# Patient Record
Sex: FEMALE | Race: AMERICAN INDIAN OR ALASKA NATIVE
[De-identification: names, ages, dates, MRNs, and addresses within clinical notes are randomized per-mention and may not be internally consistent; named-entity substitution may affect disease eponyms.]

---

## 2019-12-31 ENCOUNTER — HOSPITAL ENCOUNTER (INPATIENT)
Dept: HOSPITAL 5 - ED | Age: 79
LOS: 3 days | Discharge: NURSING, REHAB OR LONG-TERM CARE | DRG: 70 | End: 2020-01-03
Attending: INTERNAL MEDICINE | Admitting: INTERNAL MEDICINE
Payer: MEDICARE

## 2019-12-31 DIAGNOSIS — R47.81: ICD-10-CM

## 2019-12-31 DIAGNOSIS — R79.89: ICD-10-CM

## 2019-12-31 DIAGNOSIS — N18.6: ICD-10-CM

## 2019-12-31 DIAGNOSIS — R94.31: ICD-10-CM

## 2019-12-31 DIAGNOSIS — I12.0: ICD-10-CM

## 2019-12-31 DIAGNOSIS — R47.1: ICD-10-CM

## 2019-12-31 DIAGNOSIS — Z79.899: ICD-10-CM

## 2019-12-31 DIAGNOSIS — E87.5: ICD-10-CM

## 2019-12-31 DIAGNOSIS — Z60.2: ICD-10-CM

## 2019-12-31 DIAGNOSIS — E11.649: ICD-10-CM

## 2019-12-31 DIAGNOSIS — K21.9: ICD-10-CM

## 2019-12-31 DIAGNOSIS — E78.5: ICD-10-CM

## 2019-12-31 DIAGNOSIS — G93.41: Primary | ICD-10-CM

## 2019-12-31 DIAGNOSIS — M17.11: ICD-10-CM

## 2019-12-31 DIAGNOSIS — Z79.4: ICD-10-CM

## 2019-12-31 DIAGNOSIS — E03.9: ICD-10-CM

## 2019-12-31 DIAGNOSIS — I42.9: ICD-10-CM

## 2019-12-31 DIAGNOSIS — R13.10: ICD-10-CM

## 2019-12-31 DIAGNOSIS — E11.22: ICD-10-CM

## 2019-12-31 DIAGNOSIS — Z99.2: ICD-10-CM

## 2019-12-31 DIAGNOSIS — G89.29: ICD-10-CM

## 2019-12-31 DIAGNOSIS — G45.9: ICD-10-CM

## 2019-12-31 DIAGNOSIS — M16.11: ICD-10-CM

## 2019-12-31 LAB
ALBUMIN SERPL-MCNC: 3.2 G/DL (ref 3.9–5)
ALT SERPL-CCNC: 11 UNITS/L (ref 7–56)
BAND NEUTROPHILS # (MANUAL): 0 K/MM3
BILIRUB UR QL STRIP: (no result)
BLOOD UR QL VISUAL: (no result)
BUN SERPL-MCNC: 16 MG/DL (ref 7–17)
BUN/CREAT SERPL: 4 %
CALCIUM SERPL-MCNC: 8.7 MG/DL (ref 8.4–10.2)
HCT VFR BLD CALC: 46.4 % (ref 30.3–42.9)
HDLC SERPL-MCNC: 62 MG/DL (ref 40–59)
HEMOLYSIS INDEX: 26
HGB BLD-MCNC: 14.3 GM/DL (ref 10.1–14.3)
HYALINE CASTS #/AREA URNS LPF: 1 /LPF
MCHC RBC AUTO-ENTMCNC: 31 % (ref 30–34)
MCV RBC AUTO: 83 FL (ref 79–97)
MYELOCYTES # (MANUAL): 0 K/MM3
PH UR STRIP: 9 [PH] (ref 5–7)
PLATELET # BLD: 409 K/MM3 (ref 140–440)
PROMYELOCYTES # (MANUAL): 0 K/MM3
PROT UR STRIP-MCNC: (no result) MG/DL
RBC # BLD AUTO: 5.57 M/MM3 (ref 3.65–5.03)
RBC #/AREA URNS HPF: 1 /HPF (ref 0–6)
TOTAL CELLS COUNTED BLD: 100
UROBILINOGEN UR-MCNC: < 2 MG/DL (ref ?–2)
WBC #/AREA URNS HPF: 1 /HPF (ref 0–6)

## 2019-12-31 PROCEDURE — 70450 CT HEAD/BRAIN W/O DYE: CPT

## 2019-12-31 PROCEDURE — 84439 ASSAY OF FREE THYROXINE: CPT

## 2019-12-31 PROCEDURE — 93306 TTE W/DOPPLER COMPLETE: CPT

## 2019-12-31 PROCEDURE — 81001 URINALYSIS AUTO W/SCOPE: CPT

## 2019-12-31 PROCEDURE — 93010 ELECTROCARDIOGRAM REPORT: CPT

## 2019-12-31 PROCEDURE — 84436 ASSAY OF TOTAL THYROXINE: CPT

## 2019-12-31 PROCEDURE — C9113 INJ PANTOPRAZOLE SODIUM, VIA: HCPCS

## 2019-12-31 PROCEDURE — 84484 ASSAY OF TROPONIN QUANT: CPT

## 2019-12-31 PROCEDURE — 85025 COMPLETE CBC W/AUTO DIFF WBC: CPT

## 2019-12-31 PROCEDURE — 80074 ACUTE HEPATITIS PANEL: CPT

## 2019-12-31 PROCEDURE — 80061 LIPID PANEL: CPT

## 2019-12-31 PROCEDURE — 84443 ASSAY THYROID STIM HORMONE: CPT

## 2019-12-31 PROCEDURE — 84100 ASSAY OF PHOSPHORUS: CPT

## 2019-12-31 PROCEDURE — 71045 X-RAY EXAM CHEST 1 VIEW: CPT

## 2019-12-31 PROCEDURE — 83690 ASSAY OF LIPASE: CPT

## 2019-12-31 PROCEDURE — 78452 HT MUSCLE IMAGE SPECT MULT: CPT

## 2019-12-31 PROCEDURE — 80053 COMPREHEN METABOLIC PANEL: CPT

## 2019-12-31 PROCEDURE — 80048 BASIC METABOLIC PNL TOTAL CA: CPT

## 2019-12-31 PROCEDURE — 70547 MR ANGIOGRAPHY NECK W/O DYE: CPT

## 2019-12-31 PROCEDURE — 82962 GLUCOSE BLOOD TEST: CPT

## 2019-12-31 PROCEDURE — 85027 COMPLETE CBC AUTOMATED: CPT

## 2019-12-31 PROCEDURE — 93017 CV STRESS TEST TRACING ONLY: CPT

## 2019-12-31 PROCEDURE — 85007 BL SMEAR W/DIFF WBC COUNT: CPT

## 2019-12-31 PROCEDURE — A9502 TC99M TETROFOSMIN: HCPCS

## 2019-12-31 PROCEDURE — 70551 MRI BRAIN STEM W/O DYE: CPT

## 2019-12-31 PROCEDURE — 36415 COLL VENOUS BLD VENIPUNCTURE: CPT

## 2019-12-31 PROCEDURE — 93005 ELECTROCARDIOGRAM TRACING: CPT

## 2019-12-31 PROCEDURE — 70544 MR ANGIOGRAPHY HEAD W/O DYE: CPT

## 2019-12-31 RX ADMIN — DEXTROSE AND SODIUM CHLORIDE SCH MLS/HR: 5; .45 INJECTION, SOLUTION INTRAVENOUS at 20:37

## 2019-12-31 SDOH — SOCIAL STABILITY - SOCIAL INSECURITY: PROBLEMS RELATED TO LIVING ALONE: Z60.2

## 2019-12-31 NOTE — XRAY REPORT
RIGHT HIP 4 VIEWS



INDICATION / CLINICAL INFORMATION:

Right hip pain. History of arthritis.



COMPARISON:

None available.

 

FINDINGS:



BONES and JOINT(S): No acute fracture or subluxation. Severe osteoarthritis of the right hip is noted
. There is mild osteoarthritis of the left hip.

SOFT TISSUES: Severe generalized atherosclerosis is present. No additional significant abnormality.



ADDITIONAL FINDINGS: None.



IMPRESSION:

1. No acute findings.

2. Severe osteoarthritis of the right hip.



Signer Name: Brando Smith MD 

Signed: 12/31/2019 12:54 PM

 Workstation Name: [id number]

## 2019-12-31 NOTE — CAT SCAN REPORT
CT HEAD WITHOUT CONTRAST



HISTORY: Altered mental status.



TECHNIQUE:  Axial imaging performed from the skull apex through the skull base without the use of con
trast.  All CT scans at this location are performed using CT dose reduction for ALARA by means of aut
omated exposure control. 



COMPARISON:  None



FINDINGS:  



Parenchyma:  No acute intracranial hemorrhage or parenchymal abnormality.. Mild cortical atrophy and 
mild hypoattenuation throughout the white matter is noted and consistent with chronic microvascular i
schemic disease.

Ventricles:  There is mild diffuse brain atrophy with commensurate ventricular enlargement which is l
ikely age appropriate.  

Soft tissues:  Soft tissues including the orbits appear normal.   

Bones:  No acute osseous abnormality.   

Sinuses:  Sinuses and mastoid air cells are clear.





IMPRESSION: No acute abnormality. Volume loss and nonspecific chronic white matter changes.



Signer Name: Ollie Stark Jr, MD 

Signed: 12/31/2019 2:17 PM

 Workstation Name: [id number]

## 2019-12-31 NOTE — XRAY REPORT
RIGHT KNEE 3 VIEWS



INDICATION / CLINICAL INFORMATION:

Right knee pain.



COMPARISON:

None available.

 

FINDINGS:



BONES and JOINT(S): No acute fracture or subluxation. Mild to moderate tricompartmental osteoarthriti
s is noted.

SOFT TISSUES: No acute abnormality. There is severe generalized atherosclerosis.



ADDITIONAL FINDINGS: None.



IMPRESSION:

1. No acute findings.

2. Mild to moderate osteoarthritis.



Signer Name: Brando Smith MD 

Signed: 12/31/2019 1:13 PM

 Workstation Name: [id number]

## 2019-12-31 NOTE — XRAY REPORT
CHEST 1 VIEW 12/31/2019 12:07 PM



INDICATION / CLINICAL INFORMATION:

ams, due for dialysis.



COMPARISON: 

None available.



FINDINGS:



SUPPORT DEVICES: A right internal jugular vein PermCath terminates over the right atrium.

HEART / MEDIASTINUM: The heart is normal in size. The aorta is ectatic and mildly calcified. 

LUNGS / PLEURA: No significant pulmonary or pleural abnormality. No pneumothorax. 



ADDITIONAL FINDINGS: There are moderate to severe degenerative changes of the shoulders as well as de
generative changes throughout the spine.



IMPRESSION:

1. No acute abnormality of the chest. 

2. Additional findings as above.



Signer Name: Brando Smith MD 

Signed: 12/31/2019 12:53 PM

 Workstation Name: [id number]

## 2019-12-31 NOTE — CONSULTATION
History of Present Illness





- Reason for Consult


Consult date: 12/31/19


end stage renal disease, hyperkalemia


Requesting physician: ROSA BEY





- History of Present Illness





This is a 78 y/o F with PMH of ESRD on HD, HTN, DM type 2 on insulin, 

hypothyroidism, osteoarthritis, chronic hip pain who presented to [facility] ED with 

altered mental status. Pt's son visited pt and noticed she had slurred speech 

and confusion and had similar episode in the past which was related to 

hypoglycemia per medical record. In ED, BG was in the 70s, pt was vomited after 

son tried to feed her yogurt and juice, was given IV D50 with no specific 

improvement. CT Head showed no acute finding. Right knee X Ray showed no acute 

finding, but mild to moderate osteoarthritis. Right Hip X Ray showed severe 

osteoarthritis, no acute finding. Pt seen in her room, awake, recognized who I 

was, oriented to person, place, and time, follows simple commands, no family at 

bedside. Pt denies shortness of breath, nausea, vomiting, chest pain, abdominal 

pain, black or bloody stool. Pt states her only complaint right now is pain in 

her hip and knee. We were consulted to evaluate this pt who has ESRD. This pt 

undergoes outpatient HD at [facility] every TTS, last HD treatment was

12/28/19.














Past History


Past Medical History: diabetes, dialysis, ESRD, hypertension, hypothyroidism





Medications and Allergies


                                    Allergies











Allergy/AdvReac Type Severity Reaction Status Date / Time


 


No Known Allergies Allergy   Unverified 12/31/19 11:23











                                Home Medications











 Medication  Instructions  Recorded  Confirmed  Last Taken  Type


 


ALBUTEROL Inhaler (OR & NICU) 2 puff IH QID PRN 12/31/19 12/31/19 Unknown 

History





[ProAir HFA Inhaler]     


 


AtorvaSTATin [Lipitor] 40 mg PO QHS 12/31/19 12/31/19 Unknown History


 


Hydralazine HCl 50 mg PO TID 12/31/19 12/31/19 Unknown History


 


Insulin Glargine [Lantus VIAL] 20 unit SUB-Q QHS 12/31/19 12/31/19 Unknown 

History


 


Levothyroxine [Synthroid] 50 mcg PO QAM 12/31/19 12/31/19 Unknown History


 


Losartan [Cozaar] 50 mg PO QDAY 12/31/19 12/31/19 Unknown History


 


Metoprolol [Lopressor] 25 mg PO BID 12/31/19 12/31/19 Unknown History


 


NIFEdipine [Nifedipine ER] 90 mg PO DAILY 12/31/19 12/31/19 Unknown History


 


Pantoprazole [Protonix] 40 mg PO QAM 12/31/19 12/31/19 Unknown History


 


Potassium Chloride [K-Dur] 20 meq PO BID 12/31/19 12/31/19 Unknown History


 


amLODIPine [Norvasc] 10 mg PO DAILY 12/31/19 12/31/19 Unknown History


 


carvediloL [Coreg] 6.25 mg PO BID 12/31/19 12/31/19 Unknown History


 


lisinopriL [Zestril] 20 mg PO QDAY 12/31/19 12/31/19 Unknown History











Active Meds: 


Active Medications





Acetaminophen (Tylenol)  650 mg PO Q4H PRN


   PRN Reason: Pain, Mild (1-3)


Albuterol (Proventil)  2.5 mg IH Q4HRT PRN


   PRN Reason: Shortness Of Breath


Aspirin (Aspirin)  300 mg CO QDAY LUCAS


Bisacodyl (Dulcolax)  10 mg CO QDAY PRN


   PRN Reason: Constipation


Dextrose (D50w (25gm) Syringe)  50 ml IV Q30MIN PRN; Protocol


   PRN Reason: Hypoglycemia


Dextrose/Sodium Chloride (D5/0.45ns)  1,000 mls @ 42 mls/hr IV AS DIRECT LUCAS


Sodium Chloride (Nacl 0.9%)  100 mls @ 999 mls/hr IV ARGELIA PRN


   PRN Reason: Hypotension


Insulin Human Regular (Humulin R)  0 units SUB-Q Q6HR LUCAS; Protocol


Magnesium Hydroxide (Milk Of Magnesia)  30 ml PO Q4H PRN


   PRN Reason: Constipation


Metoclopramide HCl (Reglan)  10 mg PO Q6H PRN


   PRN Reason: Nausea And Vomiting


Ondansetron HCl (Zofran)  4 mg IV Q8H PRN


   PRN Reason: Nausea And Vomiting


Pantoprazole Sodium (Protonix)  40 mg IV QDAY LUCAS


Promethazine HCl (Phenergan)  25 mg CO Q6H PRN


   PRN Reason: Nausea And Vomiting


Sodium Chloride (Sodium Chloride Flush Syringe 10 Ml)  10 ml IV PRN PRN


   PRN Reason: LINE FLUSH











Review of Systems


Constitutional: fatigue, weakness, no fever, no chills


Cardiovascular: no chest pain, no shortness of breath, no dyspnea on exertion


Respiratory: no shortness of breath, no dyspnea on exertion


Gastrointestinal: no abdominal pain, no nausea, no vomiting, no diarrhea, no 

constipation, no hematemesis, no melena


Musculoskeletal: arthritis, other (knee and hip pain)


Integumentary: no wounds


Neurological: other (confused on admission)





Exam





- Vital Signs


Vital signs: 


                                   Vital Signs











Temp Pulse Resp BP Pulse Ox


 


 97.2 F L  83   18   168/87   97 


 


 12/31/19 11:12  12/31/19 11:12  12/31/19 11:12  12/31/19 11:12  12/31/19 11:12














- General Appearance


General appearance: well-developed


EENT: ATNC


Neck: Present: neck supple


Respiratory: Decreased Breath Sounds


Heart: regular, S1S2, other (ACCESS: Right IJ Perm Catheter intact)


Gastrointestinal: Present: normoactive bowel sounds.  Absent: tenderness


Integumentary: warm and dry


Neurologic: alert and oriented x3


Musculoskeletal: Present: other (no edema to BLE)


Psychiatric: mood/affect appropriate, cooperative





Results





- Lab Results





                                 12/31/19 11:40





                                 12/31/19 11:40


                             Most recent lab results











Calcium  8.7 mg/dL (8.4-10.2)   12/31/19  11:40    














Assessment and Plan





Acute Metabolic Encephalopathy, ? CVA


ESRD on HD


HTN


DM Type 2 on insulin


Hypothyroidism


Hyperkalemia


Osteoarthritis of right hip and right knee


Elevated Troponin








Plan:





- No acute indication for HD today, will also hold off on HD today given ? CVA


- HD tomorrow for gentle UF and clearance, goal UF 0-1 liter, will try to avoid 

hypotension during HD


- Assess need for HD on daily basis


- CT Head showed no acute finding, MRI Brain, MRA Head, carotid dopplers pending


- Permissive HTN, s/p ASA, neurology consulted


- Holding BP medications for now


- Cardiology consulted for possible lateral ischemia


- Renally dose meds


- This pt undergoes outpatient HD at [facility] every TTS


- Renal plan d/w Dr Adkins

## 2019-12-31 NOTE — HISTORY AND PHYSICAL REPORT
History of Present Illness


Date of admission: 


12/31/19 15:23





Chief complaint: 





Altered mental status


History of present illness: 





79-year-old woman who presents to the hospital with altered mental status.  She 

lives alone but her son visits her at night and administers Lantus if her sugar 

is greater than 250.  She is responsible only for her oral medications.  He 

states that he saw her 2 nights ago on December 29 and gave her insulin.  Her 

son usually receives a call every night, but he did not receive a call on the 

night of December 30, he assumed it was because her glucose was less than 250, 

then he went to check on her this morning and found that she had slurred speech 

and confusion, she had similar episode 2 to 3 months ago which was related to 

hypoglycemia.  However in the ER the glucose was in the 70s, the son attempted 

to feed her juice and yogurt but she vomited them shortly after swallowing.  The

patient has chronic left hip and knee pain which are unchanged


-Per the ED staff, she had waxing and waning mental status, she was agitated at 

times wanting to leave complain that she had drank too much at other times she 

was confused and withdrawn.  She failed swallow screen in the ED and therefore 

was put n.p.o., she received IV dextrose with no specific improvement





Past medical history; hypertension, diabetes, end-stage renal disease on 

dialysis Tuesday Thursday Saturday, osteoarthritis, GERD, hypothyroidism, 

hyperlipidemia,





Past surgical history; unknown





Social history, no history of smoking, lives at home, has son visits her nightly





Family history noncontributory











Medications and Allergies


                                    Allergies











Allergy/AdvReac Type Severity Reaction Status Date / Time


 


No Known Allergies Allergy   Unverified 12/31/19 11:23











                                Home Medications











 Medication  Instructions  Recorded  Confirmed  Last Taken  Type


 


ALBUTEROL Inhaler (OR & NICU) 2 puff IH QID PRN 12/31/19 12/31/19 Unknown Histor

y





[ProAir HFA Inhaler]     


 


AtorvaSTATin [Lipitor] 40 mg PO QHS 12/31/19 12/31/19 Unknown History


 


Hydralazine HCl 50 mg PO TID 12/31/19 12/31/19 Unknown History


 


Insulin Glargine [Lantus VIAL] 20 unit SUB-Q QHS 12/31/19 12/31/19 Unknown 

History


 


Levothyroxine [Synthroid] 50 mcg PO QAM 12/31/19 12/31/19 Unknown History


 


Losartan [Cozaar] 50 mg PO QDAY 12/31/19 12/31/19 Unknown History


 


Metoprolol [Lopressor] 25 mg PO BID 12/31/19 12/31/19 Unknown History


 


NIFEdipine [Nifedipine ER] 90 mg PO DAILY 12/31/19 12/31/19 Unknown History


 


Pantoprazole [Protonix] 40 mg PO QAM 12/31/19 12/31/19 Unknown History


 


Potassium Chloride [K-Dur] 20 meq PO BID 12/31/19 12/31/19 Unknown History


 


amLODIPine [Norvasc] 10 mg PO DAILY 12/31/19 12/31/19 Unknown History


 


carvediloL [Coreg] 6.25 mg PO BID 12/31/19 12/31/19 Unknown History


 


lisinopriL [Zestril] 20 mg PO QDAY 12/31/19 12/31/19 Unknown History











Active Meds: 


Active Medications





Acetaminophen (Tylenol)  650 mg PO Q4H PRN


   PRN Reason: Pain, Mild (1-3)


Albuterol (Proventil)  2.5 mg IH Q4HRT PRN


   PRN Reason: Shortness Of Breath


Aspirin (Aspirin)  300 mg VA QDAY LUCAS


Bisacodyl (Dulcolax)  10 mg VA QDAY PRN


   PRN Reason: Constipation


Dextrose (D50w (25gm) Syringe)  50 ml IV Q30MIN PRN; Protocol


   PRN Reason: Hypoglycemia


Dextrose/Sodium Chloride (D5/0.45ns)  1,000 mls @ 42 mls/hr IV AS DIRECT LUCAS


Insulin Human Regular (Humulin R)  0 units SUB-Q Q6HR [facility]; Protocol


Magnesium Hydroxide (Milk Of Magnesia)  30 ml PO Q4H PRN


   PRN Reason: Constipation


Metoclopramide HCl (Reglan)  10 mg PO Q6H PRN


   PRN Reason: Nausea And Vomiting


Ondansetron HCl (Zofran)  4 mg IV Q8H PRN


   PRN Reason: Nausea And Vomiting


Pantoprazole Sodium (Protonix)  40 mg IV QDAY LUCAS


Promethazine HCl (Phenergan)  25 mg VA Q6H PRN


   PRN Reason: Nausea And Vomiting


Sodium Chloride (Sodium Chloride Flush Syringe 10 Ml)  10 ml INJ PRN PRN


   PRN Reason: LINE FLUSH











Review of Systems


All systems: negative


Constitutional: poor appetite


Ears, nose, mouth and throat: no ear pain


Cardiovascular: no chest pain


Respiratory: no cough


Gastrointestinal: vomiting, no abdominal pain


Rectal: no pain


Musculoskeletal: no neck stiffness


Integumentary: no rash


Neurological: no head injury


Psychiatric: no anxiety


Endocrine: no cold intolerance


Hematologic/Lymphatic: no easy bruising


Allergic/Immunologic: no urticaria





Exam





- Constitutional


Vitals: 


                                        











Temp Pulse Resp BP Pulse Ox


 


 97.4 F L  70   13   157/79   96 


 


 12/31/19 15:56  12/31/19 15:00  12/31/19 15:00  12/31/19 15:00  12/31/19 15:00











General appearance: Present: mild distress, well-nourished





- EENT


Eyes: Present: PERRL


ENT: hearing intact, clear oral mucosa





- Neck


Neck: Present: supple, normal ROM





- Respiratory


Respiratory effort: normal


Respiratory: bilateral: CTA





- Cardiovascular


Heart Sounds: Present: S1 & S2.  Absent: rub, click





- Extremities


Extremities: pulses symmetrical, No edema


Peripheral Pulses: within normal limits





- Abdominal


General gastrointestinal: Present: soft, non-tender, non-distended, normal bowel

 sounds


Female genitourinary: Present: normal





- Integumentary


Integumentary: Present: clear, warm, dry





- Musculoskeletal


Musculoskeletal: gait normal, strength equal bilaterally





- Psychiatric


Psychiatric: appropriate mood/affect, intact judgment & insight





- Neurologic


Neurologic: CNII-XII intact, moves all extremities





Results





- Labs


CBC & Chem 7: 


                                 01/01/20 05:27





                                 01/01/20 05:27


Labs: 


                             Laboratory Last Values











WBC  7.7 K/mm3 (4.5-11.0)   12/31/19  11:40    


 


RBC  5.57 M/mm3 (3.65-5.03)  H  12/31/19  11:40    


 


Hgb  14.3 gm/dl (10.1-14.3)   12/31/19  11:40    


 


Hct  46.4 % (30.3-42.9)  H  12/31/19  11:40    


 


MCV  83 fl (79-97)   12/31/19  11:40    


 


MCH  26 pg (28-32)  L  12/31/19  11:40    


 


MCHC  31 % (30-34)   12/31/19  11:40    


 


RDW  16.6 % (13.2-15.2)  H  12/31/19  11:40    


 


Plt Count  409 K/mm3 (140-440)   12/31/19  11:40    


 


Add Manual Diff  Complete   12/31/19  11:40    


 


Total Counted  100   12/31/19  11:40    


 


Seg Neuts % (Manual)  91.0 % (40.0-70.0)  H  12/31/19  11:40    


 


Band Neutrophils %  0 %  12/31/19  11:40    


 


Lymphocytes % (Manual)  7.0 % (13.4-35.0)  L  12/31/19  11:40    


 


Reactive Lymphs % (Man)  0 %  12/31/19  11:40    


 


Monocytes % (Manual)  1.0 % (0.0-7.3)   12/31/19  11:40    


 


Eosinophils % (Manual)  0 % (0.0-4.3)   12/31/19  11:40    


 


Basophils % (Manual)  1.0 % (0.0-1.8)   12/31/19  11:40    


 


Metamyelocytes %  0 %  12/31/19  11:40    


 


Myelocytes %  0 %  12/31/19  11:40    


 


Promyelocytes %  0 %  12/31/19  11:40    


 


Blast Cells %  0 %  12/31/19  11:40    


 


Nucleated RBC %  1.0 % (0.0-0.9)  H  12/31/19  11:40    


 


Seg Neutrophils # Man  7.0 K/mm3 (1.8-7.7)   12/31/19  11:40    


 


Band Neutrophils #  0.0 K/mm3  12/31/19  11:40    


 


Lymphocytes # (Manual)  0.5 K/mm3 (1.2-5.4)  L  12/31/19  11:40    


 


Abs React Lymphs (Man)  0.0 K/mm3  12/31/19  11:40    


 


Monocytes # (Manual)  0.1 K/mm3 (0.0-0.8)   12/31/19  11:40    


 


Eosinophils # (Manual)  0.0 K/mm3 (0.0-0.4)   12/31/19  11:40    


 


Basophils # (Manual)  0.1 K/mm3 (0.0-0.1)   12/31/19  11:40    


 


Metamyelocytes #  0.0 K/mm3  12/31/19  11:40    


 


Myelocytes #  0.0 K/mm3  12/31/19  11:40    


 


Promyelocytes #  0.0 K/mm3  12/31/19  11:40    


 


Blast Cells #  0.0 K/mm3  12/31/19  11:40    


 


WBC Morphology  Not Reportable   12/31/19  11:40    


 


Hypersegmented Neuts  Not Reportable   12/31/19  11:40    


 


Hyposegmented Neuts  Not Reportable   12/31/19  11:40    


 


Hypogranular Neuts  Not Reportable   12/31/19  11:40    


 


Smudge Cells  Not Reportable   12/31/19  11:40    


 


Toxic Granulation  Not Reportable   12/31/19  11:40    


 


Toxic Vacuolation  Not Reportable   12/31/19  11:40    


 


Dohle Bodies  Not Reportable   12/31/19  11:40    


 


Pelger-Huet Anomaly  Not Reportable   12/31/19  11:40    


 


Clara Rods  Not Reportable   12/31/19  11:40    


 


Platelet Estimate  Consistent w auto   12/31/19  11:40    


 


Clumped Platelets  Not Reportable   12/31/19  11:40    


 


Plt Clumps, EDTA  Not Reportable   12/31/19  11:40    


 


Large Platelets  Not Reportable   12/31/19  11:40    


 


Giant Platelets  Not Reportable   12/31/19  11:40    


 


Platelet Satelliting  Not Reportable   12/31/19  11:40    


 


Plt Morphology Comment  Not Reportable   12/31/19  11:40    


 


RBC Morphology  Not Reportable   12/31/19  11:40    


 


Dimorphic RBCs  Not Reportable   12/31/19  11:40    


 


Polychromasia  Not Reportable   12/31/19  11:40    


 


Hypochromasia  Few   12/31/19  11:40    


 


Poikilocytosis  Few   12/31/19  11:40    


 


Anisocytosis  Few   12/31/19  11:40    


 


Microcytosis  Not Reportable   12/31/19  11:40    


 


Macrocytosis  Not Reportable   12/31/19  11:40    


 


Spherocytes  Not Reportable   12/31/19  11:40    


 


Pappenheimer Bodies  Not Reportable   12/31/19  11:40    


 


Sickle Cells  Not Reportable   12/31/19  11:40    


 


Target Cells  Few   12/31/19  11:40    


 


Tear Drop Cells  Not Reportable   12/31/19  11:40    


 


Ovalocytes  Not Reportable   12/31/19  11:40    


 


Helmet Cells  Not Reportable   12/31/19  11:40    


 


[employer] Bodies  Not Reportable   12/31/19  11:40    


 


Cabot Rings  Not Reportable   12/31/19  11:40    


 


[place] Cells  Not Reportable   12/31/19  11:40    


 


Bite Cells  Not Reportable   12/31/19  11:40    


 


Crenated Cell  Not Reportable   12/31/19  11:40    


 


Elliptocytes  Not Reportable   12/31/19  11:40    


 


Acanthocytes (Spur)  Not Reportable   12/31/19  11:40    


 


Rouleaux  Not Reportable   12/31/19  11:40    


 


Hemoglobin C Crystals  Not Reportable   12/31/19  11:40    


 


Schistocytes  Not Reportable   12/31/19  11:40    


 


Malaria parasites  Not Reportable   12/31/19  11:40    


 


Miah Bodies  Not Reportable   12/31/19  11:40    


 


Hem Pathologist Commnt  No   12/31/19  11:40    


 


Sodium  139 mmol/L (137-145)   12/31/19  11:40    


 


Potassium  5.1 mmol/L (3.6-5.0)  H  12/31/19  11:40    


 


Chloride  99.7 mmol/L ()   12/31/19  11:40    


 


Carbon Dioxide  24 mmol/L (22-30)   12/31/19  11:40    


 


Anion Gap  20 mmol/L  12/31/19  11:40    


 


BUN  16 mg/dL (7-17)   12/31/19  11:40    


 


Creatinine  3.9 mg/dL (0.7-1.2)  H  12/31/19  11:40    


 


Estimated GFR  13 ml/min  12/31/19  11:40    


 


BUN/Creatinine Ratio  4 %  12/31/19  11:40    


 


Glucose  70 mg/dL ()   12/31/19  11:40    


 


POC Glucose  71  ()   12/31/19  16:05    


 


Calcium  8.7 mg/dL (8.4-10.2)   12/31/19  11:40    


 


Total Bilirubin  0.40 mg/dL (0.1-1.2)   12/31/19  11:40    


 


AST  28 units/L (5-40)   12/31/19  11:40    


 


ALT  11 units/L (7-56)   12/31/19  11:40    


 


Alkaline Phosphatase  87 units/L ()   12/31/19  11:40    


 


Troponin T  0.027 ng/mL (0.00-0.029)   12/31/19  15:45    


 


Total Protein  6.1 g/dL (6.3-8.2)  L  12/31/19  11:40    


 


Albumin  3.2 g/dL (3.9-5)  L  12/31/19  11:40    


 


Albumin/Globulin Ratio  1.1 %  12/31/19  11:40    


 


Triglycerides  103 mg/dL (2-149)   12/31/19  11:40    


 


Cholesterol  264 mg/dL ()  H  12/31/19  11:40    


 


LDL Cholesterol Direct  182 mg/dL ()  H  12/31/19  11:40    


 


HDL Cholesterol  62 mg/dL (40-59)  H  12/31/19  11:40    


 


Cholesterol/HDL Ratio  4.25 %  12/31/19  11:40    


 


Lipase  14 units/L (13-60)   12/31/19  11:40    


 


Urine Color  Yellow  (Yellow)   12/31/19  13:06    


 


Urine Turbidity  Clear  (Clear)   12/31/19  13:06    


 


Urine pH  9.0  (5.0-7.0)  H  12/31/19  13:06    


 


Ur Specific Gravity  1.011  (1.003-1.030)   12/31/19  13:06    


 


Urine Protein  >2000 mg dl mg/dL (Negative)   12/31/19  13:06    


 


Urine Glucose (UA)  50 mg/dL (Negative)   12/31/19  13:06    


 


Urine Ketones  Tr mg/dL (Negative)   12/31/19  13:06    


 


Urine Blood  Neg  (Negative)   12/31/19  13:06    


 


Urine Nitrite  Neg  (Negative)   12/31/19  13:06    


 


Urine Bilirubin  Neg  (Negative)   12/31/19  13:06    


 


Urine Urobilinogen  < 2.0 mg/dL (<2.0)   12/31/19  13:06    


 


Ur Leukocyte Esterase  Neg  (Negative)   12/31/19  13:06    


 


Urine WBC (Auto)  1.0 /HPF (0.0-6.0)   12/31/19  13:06    


 


Urine RBC (Auto)  1.0 /HPF (0.0-6.0)   12/31/19  13:06    


 


Hyaline Casts  1 /LPF  12/31/19  13:06    














Assessment and Plan


Assessment and plan: 





79-year-old woman who presents to the hospital altered mental status





CT head; no acute abnormality, nonspecific chronic white matter changes





X-rays of the right hip and right knee show osteoarthritis





Chest x-ray no acute findings





Acute metabolic encephalopathy, may be due to hypoglycemia vs CVA


Tele- neurology input appreciated, neurology consulted,


Allow permissive hypertension, has received aspirin, stroke education, observe 

for arrhythmia on telemetry, -neurology consult


--aspirin and high dose statin, check Lipid panel


MRI brain, MRA head, carotid Dopplers, echo


Bedside swallow evaluation





History of hypothyroidism


Check thyroid function tests





Abnormal EKG


Concern for possible lateral ischemia, peak troponin is 0.04, and trending down,

 as patient does not have chest pain have presentation is less likely to be 

consistent with non-STEMI


Cardiology consult





ESRD


HD per nephrologist





Diabetes


Consistent carbohydrate diet, sliding scale insulin, check A1c


-As her glucose was in the 70s and patient is n.p.o., will place her on dextrose

 containing drip





Dysphagia


Speech pathologist consult, keep n.p.o., aspirin to be given per rectal


 


Preventative health counseling performed for 17 minutes


dvt ppx- scds

## 2019-12-31 NOTE — EMERGENCY DEPARTMENT REPORT
ED Altered Mental Status HPI





- General


Chief Complaint: Altered Mental Status


Stated Complaint: GENERAL MALAISE


Time Seen by Provider: 12/31/19 11:39


Source: EMS


Mode of arrival: Stretcher


Limitations: Altered Mental Status





- History of Present Illness


Initial Comments: 





79-year-old female the past medical history of end stage renal disease on 

dialysis Tuesday, Thursday, and Saturday, diabetes on insulin, hypertension, and

chronic pain secondary to arthritis presents to the hospital with alteration of 

mental status.  Patient lives alone but her son usually comes by at night to 

administer her Lantus only if her sugar is greater than 250 otherwise, patient 

is responsible for her own oral medications.  He last spoke to patient on 2 days

ago on December 29 and that is the last time he gave her Insulin.  This morning 

he realized she did not receive his typical nightly phone call from her 

regarding her sugar and went to check on her this morning.  Patient was found 

altered with slurred speech and confusion.  She had a similar episode 2-3 months

ago related to hypoglycemia that improved with treatment.  Glucose was in the 

70s at the house.  Son attempted to feed yogurt and juice but she vomited after 

ingestion. Pt complains of chronic left hip and knee pain and denies recent 





- Related Data


                                Home Medications











 Medication  Instructions  Recorded  Confirmed  Last Taken


 


ALBUTEROL Inhaler (OR & NICU) 2 puff IH QID PRN 12/31/19 12/31/19 Unknown





[ProAir HFA Inhaler]    


 


AtorvaSTATin [Lipitor] 40 mg PO QHS 12/31/19 12/31/19 Unknown


 


Hydralazine HCl 50 mg PO TID 12/31/19 12/31/19 Unknown


 


Insulin Glargine [Lantus VIAL] 20 unit SUB-Q QHS 12/31/19 12/31/19 Unknown


 


Levothyroxine [Synthroid] 50 mcg PO QAM 12/31/19 12/31/19 Unknown


 


Losartan [Cozaar] 50 mg PO QDAY 12/31/19 12/31/19 Unknown


 


Metoprolol [Lopressor] 25 mg PO BID 12/31/19 12/31/19 Unknown


 


NIFEdipine [Nifedipine ER] 90 mg PO DAILY 12/31/19 12/31/19 Unknown


 


Pantoprazole [Protonix] 40 mg PO QAM 12/31/19 12/31/19 Unknown


 


Potassium Chloride [K-Dur] 20 meq PO BID 12/31/19 12/31/19 Unknown


 


amLODIPine [Norvasc] 10 mg PO DAILY 12/31/19 12/31/19 Unknown


 


carvediloL [Coreg] 6.25 mg PO BID 12/31/19 12/31/19 Unknown


 


lisinopriL [Zestril] 20 mg PO QDAY 12/31/19 12/31/19 Unknown











                                    Allergies











Allergy/AdvReac Type Severity Reaction Status Date / Time


 


No Known Allergies Allergy   Unverified 12/31/19 11:23














ED Review of Systems


ROS: 


Stated complaint: GENERAL MALAISE


Other details as noted in HPI





Comment: All other systems reviewed and negative





ED Past Medical Hx





- Past Medical History


Previous Medical History?: Yes


Hx Hypertension: Yes


Hx Diabetes: Yes


Hx Renal Disease: Yes (HD TTS)


Additional medical history: arthritis





- Social History


Smoking Status: Never Smoker


Substance Use Type: None





- Medications


Home Medications: 


                                Home Medications











 Medication  Instructions  Recorded  Confirmed  Last Taken  Type


 


ALBUTEROL Inhaler (OR & NICU) 2 puff IH QID PRN 12/31/19 12/31/19 Unknown 

History





[ProAir HFA Inhaler]     


 


AtorvaSTATin [Lipitor] 40 mg PO QHS 12/31/19 12/31/19 Unknown History


 


Hydralazine HCl 50 mg PO TID 12/31/19 12/31/19 Unknown History


 


Insulin Glargine [Lantus VIAL] 20 unit SUB-Q QHS 12/31/19 12/31/19 Unknown 

History


 


Levothyroxine [Synthroid] 50 mcg PO QAM 12/31/19 12/31/19 Unknown History


 


Losartan [Cozaar] 50 mg PO QDAY 12/31/19 12/31/19 Unknown History


 


Metoprolol [Lopressor] 25 mg PO BID 12/31/19 12/31/19 Unknown History


 


NIFEdipine [Nifedipine ER] 90 mg PO DAILY 12/31/19 12/31/19 Unknown History


 


Pantoprazole [Protonix] 40 mg PO QAM 12/31/19 12/31/19 Unknown History


 


Potassium Chloride [K-Dur] 20 meq PO BID 12/31/19 12/31/19 Unknown History


 


amLODIPine [Norvasc] 10 mg PO DAILY 12/31/19 12/31/19 Unknown History


 


carvediloL [Coreg] 6.25 mg PO BID 12/31/19 12/31/19 Unknown History


 


lisinopriL [Zestril] 20 mg PO QDAY 12/31/19 12/31/19 Unknown History














ED Physical Exam





- General


Limitations: Altered Mental Status





- Other


Other exam information: 





General: No acute distress


Head: Atraumatic


Eyes: normal appearance


ENT: Moist mucous membranes


Neck: Normal appearance, no midline tenderness


Chest: Clear to auscultation bilaterally


CV: Regular rate and rhythm


Abdomen: Soft, normal bowel sounds, nontender, nondistended, no rebound or 

guarding


Back: Normal inspection


Extremity: Normal inspection, pain with passive movement of right hip and right 

knee


Neuro: Alert O x 3 self, place but states year is 2009, no facial asymmetry, 

speech slurred, no gross motor sensory deficit


Psych: Appropriate behavior


Skin: No rash





ED Course


                                   Vital Signs











  12/31/19 12/31/19 12/31/19





  11:12 12:01 12:25


 


Temperature 97.2 F L  


 


Pulse Rate 83 79 


 


Respiratory 18 13 16





Rate   


 


Blood Pressure 168/87 179/119 


 


O2 Sat by Pulse 97 97 





Oximetry   














  12/31/19 12/31/19 12/31/19





  13:00 14:11 15:00


 


Temperature   


 


Pulse Rate 86 78 70


 


Respiratory 14 14 13





Rate   


 


Blood Pressure 161/81 161/81 157/79


 


O2 Sat by Pulse 97 96 96





Oximetry   














- Lab Data


Result diagrams: 


                                 12/31/19 11:40





                                 12/31/19 11:40


                                   Lab Results











  12/31/19 12/31/19 12/31/19 Range/Units





  11:30 11:40 11:40 


 


WBC   7.7   (4.5-11.0)  K/mm3


 


RBC   5.57 H   (3.65-5.03)  M/mm3


 


Hgb   14.3   (10.1-14.3)  gm/dl


 


Hct   46.4 H   (30.3-42.9)  %


 


MCV   83   (79-97)  fl


 


MCH   26 L   (28-32)  pg


 


MCHC   31   (30-34)  %


 


RDW   16.6 H   (13.2-15.2)  %


 


Plt Count   409   (140-440)  K/mm3


 


Add Manual Diff   Complete   


 


Total Counted   100   


 


Seg Neuts % (Manual)   91.0 H   (40.0-70.0)  %


 


Band Neutrophils %   0   %


 


Lymphocytes % (Manual)   7.0 L   (13.4-35.0)  %


 


Reactive Lymphs % (Man)   0   %


 


Monocytes % (Manual)   1.0   (0.0-7.3)  %


 


Eosinophils % (Manual)   0   (0.0-4.3)  %


 


Basophils % (Manual)   1.0   (0.0-1.8)  %


 


Metamyelocytes %   0   %


 


Myelocytes %   0   %


 


Promyelocytes %   0   %


 


Blast Cells %   0   %


 


Nucleated RBC %   1.0 H   (0.0-0.9)  %


 


Seg Neutrophils # Man   7.0   (1.8-7.7)  K/mm3


 


Band Neutrophils #   0.0   K/mm3


 


Lymphocytes # (Manual)   0.5 L   (1.2-5.4)  K/mm3


 


Abs React Lymphs (Man)   0.0   K/mm3


 


Monocytes # (Manual)   0.1   (0.0-0.8)  K/mm3


 


Eosinophils # (Manual)   0.0   (0.0-0.4)  K/mm3


 


Basophils # (Manual)   0.1   (0.0-0.1)  K/mm3


 


Metamyelocytes #   0.0   K/mm3


 


Myelocytes #   0.0   K/mm3


 


Promyelocytes #   0.0   K/mm3


 


Blast Cells #   0.0   K/mm3


 


WBC Morphology   Not Reportable   


 


Hypersegmented Neuts   Not Reportable   


 


Hyposegmented Neuts   Not Reportable   


 


Hypogranular Neuts   Not Reportable   


 


Smudge Cells   Not Reportable   


 


Toxic Granulation   Not Reportable   


 


Toxic Vacuolation   Not Reportable   


 


Dohle Bodies   Not Reportable   


 


Pelger-Huet Anomaly   Not Reportable   


 


Clara Rods   Not Reportable   


 


Platelet Estimate   Consistent w auto   


 


Clumped Platelets   Not Reportable   


 


Plt Clumps, EDTA   Not Reportable   


 


Large Platelets   Not Reportable   


 


Giant Platelets   Not Reportable   


 


Platelet Satelliting   Not Reportable   


 


Plt Morphology Comment   Not Reportable   


 


RBC Morphology   Not Reportable   


 


Dimorphic RBCs   Not Reportable   


 


Polychromasia   Not Reportable   


 


Hypochromasia   Few   


 


Poikilocytosis   Few   


 


Anisocytosis   Few   


 


Microcytosis   Not Reportable   


 


Macrocytosis   Not Reportable   


 


Spherocytes   Not Reportable   


 


Pappenheimer Bodies   Not Reportable   


 


Sickle Cells   Not Reportable   


 


Target Cells   Few   


 


Tear Drop Cells   Not Reportable   


 


Ovalocytes   Not Reportable   


 


Helmet Cells   Not Reportable   


 


Camacho-Du[place]ne Bodies   Not Reportable   


 


Cabot Rings   Not Reportable   


 


Denis Cells   Not Reportable   


 


Bite Cells   Not Reportable   


 


Crenated Cell   Not Reportable   


 


Elliptocytes   Not Reportable   


 


Acanthocytes (Spur)   Not Reportable   


 


Rouleaux   Not Reportable   


 


Hemoglobin C Crystals   Not Reportable   


 


Schistocytes   Not Reportable   


 


Malaria parasites   Not Reportable   


 


Miah Bodies   Not Reportable   


 


Hem Pathologist Commnt   No   


 


Sodium    139  (137-145)  mmol/L


 


Potassium    5.1 H  (3.6-5.0)  mmol/L


 


Chloride    99.7  ()  mmol/L


 


Carbon Dioxide    24  (22-30)  mmol/L


 


Anion Gap    20  mmol/L


 


BUN    16  (7-17)  mg/dL


 


Creatinine    3.9 H  (0.7-1.2)  mg/dL


 


Estimated GFR    13  ml/min


 


BUN/Creatinine Ratio    4  %


 


Glucose    70  ()  mg/dL


 


POC Glucose  60 L    ()  


 


Calcium    8.7  (8.4-10.2)  mg/dL


 


Total Bilirubin    0.40  (0.1-1.2)  mg/dL


 


AST    28  (5-40)  units/L


 


ALT    11  (7-56)  units/L


 


Alkaline Phosphatase    87  ()  units/L


 


Troponin T     (0.00-0.029)  ng/mL


 


Total Protein    6.1 L  (6.3-8.2)  g/dL


 


Albumin    3.2 L  (3.9-5)  g/dL


 


Albumin/Globulin Ratio    1.1  %


 


Triglycerides     (2-149)  mg/dL


 


Cholesterol     ()  mg/dL


 


LDL Cholesterol Direct     ()  mg/dL


 


HDL Cholesterol     (40-59)  mg/dL


 


Cholesterol/HDL Ratio     %


 


Lipase    14  (13-60)  units/L


 


Urine Color     (Yellow)  


 


Urine Turbidity     (Clear)  


 


Urine pH     (5.0-7.0)  


 


Ur Specific Gravity     (1.003-1.030)  


 


Urine Protein     (Negative)  mg/dL


 


Urine Glucose (UA)     (Negative)  mg/dL


 


Urine Ketones     (Negative)  mg/dL


 


Urine Blood     (Negative)  


 


Urine Nitrite     (Negative)  


 


Urine Bilirubin     (Negative)  


 


Urine Urobilinogen     (<2.0)  mg/dL


 


Ur Leukocyte Esterase     (Negative)  


 


Urine WBC (Auto)     (0.0-6.0)  /HPF


 


Urine RBC (Auto)     (0.0-6.0)  /HPF


 


Hyaline Casts     /LPF














  12/31/19 12/31/19 12/31/19 Range/Units





  11:40 12:45 13:06 


 


WBC     (4.5-11.0)  K/mm3


 


RBC     (3.65-5.03)  M/mm3


 


Hgb     (10.1-14.3)  gm/dl


 


Hct     (30.3-42.9)  %


 


MCV     (79-97)  fl


 


MCH     (28-32)  pg


 


MCHC     (30-34)  %


 


RDW     (13.2-15.2)  %


 


Plt Count     (140-440)  K/mm3


 


Add Manual Diff     


 


Total Counted     


 


Seg Neuts % (Manual)     (40.0-70.0)  %


 


Band Neutrophils %     %


 


Lymphocytes % (Manual)     (13.4-35.0)  %


 


Reactive Lymphs % (Man)     %


 


Monocytes % (Manual)     (0.0-7.3)  %


 


Eosinophils % (Manual)     (0.0-4.3)  %


 


Basophils % (Manual)     (0.0-1.8)  %


 


Metamyelocytes %     %


 


Myelocytes %     %


 


Promyelocytes %     %


 


Blast Cells %     %


 


Nucleated RBC %     (0.0-0.9)  %


 


Seg Neutrophils # Man     (1.8-7.7)  K/mm3


 


Band Neutrophils #     K/mm3


 


Lymphocytes # (Manual)     (1.2-5.4)  K/mm3


 


Abs React Lymphs (Man)     K/mm3


 


Monocytes # (Manual)     (0.0-0.8)  K/mm3


 


Eosinophils # (Manual)     (0.0-0.4)  K/mm3


 


Basophils # (Manual)     (0.0-0.1)  K/mm3


 


Metamyelocytes #     K/mm3


 


Myelocytes #     K/mm3


 


Promyelocytes #     K/mm3


 


Blast Cells #     K/mm3


 


WBC Morphology     


 


Hypersegmented Neuts     


 


Hyposegmented Neuts     


 


Hypogranular Neuts     


 


Smudge Cells     


 


Toxic Granulation     


 


Toxic Vacuolation     


 


Dohle Bodies     


 


Pelger-Huet Anomaly     


 


Clara Rods     


 


Platelet Estimate     


 


Clumped Platelets     


 


Plt Clumps, EDTA     


 


Large Platelets     


 


Giant Platelets     


 


Platelet Satelliting     


 


Plt Morphology Comment     


 


RBC Morphology     


 


Dimorphic RBCs     


 


Polychromasia     


 


Hypochromasia     


 


Poikilocytosis     


 


Anisocytosis     


 


Microcytosis     


 


Macrocytosis     


 


Spherocytes     


 


Pappenheimer Bodies     


 


Sickle Cells     


 


Target Cells     


 


Tear Drop Cells     


 


Ovalocytes     


 


Helmet Cells     


 


Camacho-Duquesne Bodies     


 


Cabot Rings     


 


Elk Cells     


 


Bite Cells     


 


Crenated Cell     


 


Elliptocytes     


 


Acanthocytes (Spur)     


 


Rouleaux     


 


Hemoglobin C Crystals     


 


Schistocytes     


 


Malaria parasites     


 


Miah Bodies     


 


Hem Pathologist Commnt     


 


Sodium     (137-145)  mmol/L


 


Potassium     (3.6-5.0)  mmol/L


 


Chloride     ()  mmol/L


 


Carbon Dioxide     (22-30)  mmol/L


 


Anion Gap     mmol/L


 


BUN     (7-17)  mg/dL


 


Creatinine     (0.7-1.2)  mg/dL


 


Estimated GFR     ml/min


 


BUN/Creatinine Ratio     %


 


Glucose     ()  mg/dL


 


POC Glucose   169 H   ()  


 


Calcium     (8.4-10.2)  mg/dL


 


Total Bilirubin     (0.1-1.2)  mg/dL


 


AST     (5-40)  units/L


 


ALT     (7-56)  units/L


 


Alkaline Phosphatase     ()  units/L


 


Troponin T  0.042 H    (0.00-0.029)  ng/mL


 


Total Protein     (6.3-8.2)  g/dL


 


Albumin     (3.9-5)  g/dL


 


Albumin/Globulin Ratio     %


 


Triglycerides  103    (2-149)  mg/dL


 


Cholesterol  264 H    ()  mg/dL


 


LDL Cholesterol Direct  182 H    ()  mg/dL


 


HDL Cholesterol  62 H    (40-59)  mg/dL


 


Cholesterol/HDL Ratio  4.25    %


 


Lipase     (13-60)  units/L


 


Urine Color    Yellow  (Yellow)  


 


Urine Turbidity    Clear  (Clear)  


 


Urine pH    9.0 H  (5.0-7.0)  


 


Ur Specific Gravity    1.011  (1.003-1.030)  


 


Urine Protein    >2000 mg dl  (Negative)  mg/dL


 


Urine Glucose (UA)    50  (Negative)  mg/dL


 


Urine Ketones    Tr  (Negative)  mg/dL


 


Urine Blood    Neg  (Negative)  


 


Urine Nitrite    Neg  (Negative)  


 


Urine Bilirubin    Neg  (Negative)  


 


Urine Urobilinogen    < 2.0  (<2.0)  mg/dL


 


Ur Leukocyte Esterase    Neg  (Negative)  


 


Urine WBC (Auto)    1.0  (0.0-6.0)  /HPF


 


Urine RBC (Auto)    1.0  (0.0-6.0)  /HPF


 


Hyaline Casts    1  /LPF














- EKG Data


-: EKG Interpreted by Me


EKG shows normal: sinus rhythm, ST-T waves (no stemi)


Rate: normal (81)





- Radiology Data


Radiology results: report reviewed





CT HEAD WITHOUT CONTRAST HISTORY: Altered mental status. TECHNIQUE: Axial 

imaging performed from the skull apex through the skull base without the use of 

contrast. All CT scans at this location are performed using CT dose reduction 

for ALARA by means of automated exposure control. COMPARISON: None FINDINGS: 

Parenchyma: No acute intracranial hemorrhage or parenchymal abnormality.. Mild 

cortical atrophy and mild hypoattenuation throughout the white matter is noted 

and consistent with chronic microvascular ischemic disease. Ventricles: There is

 mild diffuse brain atrophy with commensurate ventricular enlargement which is 

likely age appropriate. Soft tissues: Soft tissues including the orbits appear 

normal. Bones: No acute osseous abnormality. Sinuses: Sinuses and mastoid air 

cells are clear. IMPRESSION: No acute abnormality. Volume loss and nonspecific 

chronic white matter changes. 








RIGHT HIP 4 VIEWS INDICATION / CLINICAL INFORMATION: Right hip pain. History of 

arthritis. COMPARISON: None available. FINDINGS: BONES and JOINT(S): No acute 

fracture or subluxation. Severe osteoarthritis of the right hip is noted. There 

is mild osteoarthritis of the left hip. SOFT TISSUES: Severe generalized 

atherosclerosis is present. No additional significant abnormality. ADDITIONAL 

FINDINGS: None. IMPRESSION: 1. No acute findings. 2. Severe osteoarthritis of 

the right hip. 





RIGHT KNEE 3 VIEWS INDICATION / CLINICAL INFORMATION: Right knee pain. 

COMPARISON: None available. FINDINGS: BONES and JOINT(S): No acute fracture or 

subluxation. Mild to moderate tricompartmental osteoarthritis is noted. SOFT 

TISSUES: No acute abnormality. There is severe generalized atherosclerosis. 

ADDITIONAL FINDINGS: None. IMPRESSION: 1. No acute findings. 2. Mild to moderate

 osteoarthritis. 





CHEST 1 VIEW 12/31/2019 12:07 PM INDICATION / CLINICAL INFORMATION: ams, due for

 dialysis. COMPARISON: None available. FINDINGS: SUPPORT DEVICES: A right 

internal jugular vein PermCath terminates over the right atrium. HEART / 

MEDIASTINUM: The heart is normal in size. The aorta is ectatic and mildly 

calcified. LUNGS / PLEURA: No significant pulmonary or pleural abnormality. No 

pneumothorax. ADDITIONAL FINDINGS: There are moderate to severe degenerative 

changes of the shoulders as well as degenerative changes throughout the spine. 

IMPRESSION: 1. No acute abnormality of the chest. 2. Additional findings as 

above. 





- Medical Decision Making


pt having waxing in waning sx in ed. at times she is agitated and wanted to 

leave and stating that she drank too much, then other times she is confused and 

withdrawb.


pt failed swallow screen in ED so NPO orders placed


pt with equal hand  and foot dorsiflexion


chronic right knee and hip pain due to arthritis without acute imaging 

abnormality.


pt tx with IV dextrose for glucose in the 70s (last insulin dose 2 days ago as 

per son)


ct head without acute findings


mild trop elevation likely due to esrd, repeat pending AL asa provided


pt will be admitted to hospitalist service for further tx.





- Differential Diagnosis


cva, ich, hypoglyemia, demenita, infection, delerium uremia, encephalopathy


Critical Care Time: No


Critical care attestation.: 


If time is entered above; I have spent that time in minutes in the direct care 

of this critically ill patient, excluding procedure time.








ED Disposition


Clinical Impression: 


 Altered mental state, ESRD on dialysis, Diabetes





Disposition: [id number] OP ADMIT IP TO THIS HOSP


Is pt being admited?: Yes


Condition: Stable


Time of Disposition: 15:22 (dr Mcdaniels/hosp)

## 2020-01-01 LAB
BUN SERPL-MCNC: 19 MG/DL (ref 7–17)
BUN/CREAT SERPL: 5 %
CALCIUM SERPL-MCNC: 8 MG/DL (ref 8.4–10.2)
HCT VFR BLD CALC: 38.8 % (ref 30.3–42.9)
HEMOLYSIS INDEX: 11
HGB BLD-MCNC: 12 GM/DL (ref 10.1–14.3)
MCHC RBC AUTO-ENTMCNC: 31 % (ref 30–34)
MCV RBC AUTO: 83 FL (ref 79–97)
PLATELET # BLD: 351 K/MM3 (ref 140–440)
RBC # BLD AUTO: 4.66 M/MM3 (ref 3.65–5.03)

## 2020-01-01 PROCEDURE — 5A1D70Z PERFORMANCE OF URINARY FILTRATION, INTERMITTENT, LESS THAN 6 HOURS PER DAY: ICD-10-PCS | Performed by: INTERNAL MEDICINE

## 2020-01-01 RX ADMIN — PANTOPRAZOLE SODIUM SCH MG: 40 INJECTION, POWDER, FOR SOLUTION INTRAVENOUS at 10:55

## 2020-01-01 RX ADMIN — LOSARTAN POTASSIUM SCH: 50 TABLET, FILM COATED ORAL at 20:18

## 2020-01-01 RX ADMIN — DEXTROSE AND SODIUM CHLORIDE SCH MLS/HR: 5; .45 INJECTION, SOLUTION INTRAVENOUS at 22:51

## 2020-01-01 RX ADMIN — INSULIN HUMAN SCH: 100 INJECTION, SOLUTION PARENTERAL at 18:02

## 2020-01-01 RX ADMIN — INSULIN HUMAN SCH: 100 INJECTION, SOLUTION PARENTERAL at 06:28

## 2020-01-01 RX ADMIN — ASPIRIN SCH MG: 300 SUPPOSITORY RECTAL at 10:55

## 2020-01-01 RX ADMIN — INSULIN HUMAN SCH: 100 INJECTION, SOLUTION PARENTERAL at 16:18

## 2020-01-01 RX ADMIN — INSULIN HUMAN SCH: 100 INJECTION, SOLUTION PARENTERAL at 01:12

## 2020-01-01 RX ADMIN — METOPROLOL TARTRATE SCH MG: 25 TABLET, FILM COATED ORAL at 23:05

## 2020-01-01 RX ADMIN — HEPARIN SODIUM SCH UNIT: 5000 INJECTION, SOLUTION INTRAVENOUS; SUBCUTANEOUS at 23:07

## 2020-01-01 NOTE — PROGRESS NOTE
Assessment and Plan


Assessment and plan: 





79-year-old woman who presents to the hospital altered mental status





CT head; no acute abnormality, nonspecific chronic white matter changes





X-rays of the right hip and right knee show osteoarthritis





Chest x-ray no acute findings





Acute metabolic encephalopathy, may be due to hypoglycemia vs CVA


Tele- neurology input appreciated, neurology consulted,


Allow permissive hypertension, has received aspirin, stroke education, observe 

for arrhythmia on telemetry, -neurology consult


--aspirin and high dose statin, check Lipid panel


MRI brain, MRA head, carotid Dopplers, echo


Bedside swallow evaluation


MRI Brain pending





History of hypothyroidism


Check thyroid function tests





Abnormal EKG


Concern for possible lateral ischemia, peak troponin is 0.04, and trending down,

as patient does not have chest pain have presentation is less likely to be 

consistent with non-STEMI


Cardiology consulted





ESRD


HD per nephrologist





Diabetes


Consistent carbohydrate diet, sliding scale insulin, check A1c


-As her glucose was in the 70s and patient is n.p.o., will place her on dextrose

containing drip





Dysphagia


Speech pathologist consulted,


passed swallow eval, started on mechanical soft diet.


 


Full code status.








History


Interval history: 


Admitted with altered mental status








Hospitalist Physical





- Physical exam


Narrative exam: 








General appearance: Present: well-nourished





- EENT


Eyes: Present: PERRL


ENT: hearing intact, clear oral mucosa





- Neck


Neck: Present: supple, normal ROM





- Respiratory


Respiratory effort: normal


Respiratory: bilateral: CTA





- Cardiovascular


Heart Sounds: Present: S1 & S2.  Absent: rub, click





- Extremities


Extremities: pulses symmetrical, No edema


Peripheral Pulses: within normal limits





- Abdominal


General gastrointestinal: Present: soft, non-tender, non-distended, normal bowel

sounds


Female genitourinary: Present: normal





- Integumentary


Integumentary: Present: clear, warm, dry





- Musculoskeletal


Musculoskeletal: gait normal, strength equal bilaterally





- Psychiatric


Psychiatric: appropriate mood/affect, intact judgment & insight





- Neurologic


Neurologic: CNII-XII intact, Awake,alert, moves all extremities








- Constitutional


Vitals: 


                                        











Temp Pulse Resp BP Pulse Ox


 


 97.7 F   75   20   164/89   97 


 


 01/01/20 07:28  01/01/20 07:28  01/01/20 07:28  01/01/20 07:28  01/01/20 07:28














Results





- Labs


CBC & Chem 7: 


                                 01/02/20 04:53





                                 01/02/20 04:53


Labs: 


                             Laboratory Last Values











WBC  5.8 K/mm3 (4.5-11.0)   01/01/20  05:27    


 


RBC  4.66 M/mm3 (3.65-5.03)   01/01/20  05:27    


 


Hgb  12.0 gm/dl (10.1-14.3)   01/01/20  05:27    


 


Hct  38.8 % (30.3-42.9)  D 01/01/20  05:27    


 


MCV  83 fl (79-97)   01/01/20  05:27    


 


MCH  26 pg (28-32)  L  01/01/20  05:27    


 


MCHC  31 % (30-34)   01/01/20  05:27    


 


RDW  16.5 % (13.2-15.2)  H  01/01/20  05:27    


 


Plt Count  351 K/mm3 (140-440)   01/01/20  05:27    


 


Add Manual Diff  Complete   12/31/19  11:40    


 


Total Counted  100   12/31/19  11:40    


 


Seg Neuts % (Manual)  91.0 % (40.0-70.0)  H  12/31/19  11:40    


 


Band Neutrophils %  0 %  12/31/19  11:40    


 


Lymphocytes % (Manual)  7.0 % (13.4-35.0)  L  12/31/19  11:40    


 


Reactive Lymphs % (Man)  0 %  12/31/19  11:40    


 


Monocytes % (Manual)  1.0 % (0.0-7.3)   12/31/19  11:40    


 


Eosinophils % (Manual)  0 % (0.0-4.3)   12/31/19  11:40    


 


Basophils % (Manual)  1.0 % (0.0-1.8)   12/31/19  11:40    


 


Metamyelocytes %  0 %  12/31/19  11:40    


 


Myelocytes %  0 %  12/31/19  11:40    


 


Promyelocytes %  0 %  12/31/19  11:40    


 


Blast Cells %  0 %  12/31/19  11:40    


 


Nucleated RBC %  1.0 % (0.0-0.9)  H  12/31/19  11:40    


 


Seg Neutrophils # Man  7.0 K/mm3 (1.8-7.7)   12/31/19  11:40    


 


Band Neutrophils #  0.0 K/mm3  12/31/19  11:40    


 


Lymphocytes # (Manual)  0.5 K/mm3 (1.2-5.4)  L  12/31/19  11:40    


 


Abs React Lymphs (Man)  0.0 K/mm3  12/31/19  11:40    


 


Monocytes # (Manual)  0.1 K/mm3 (0.0-0.8)   12/31/19  11:40    


 


Eosinophils # (Manual)  0.0 K/mm3 (0.0-0.4)   12/31/19  11:40    


 


Basophils # (Manual)  0.1 K/mm3 (0.0-0.1)   12/31/19  11:40    


 


Metamyelocytes #  0.0 K/mm3  12/31/19  11:40    


 


Myelocytes #  0.0 K/mm3  12/31/19  11:40    


 


Promyelocytes #  0.0 K/mm3  12/31/19  11:40    


 


Blast Cells #  0.0 K/mm3  12/31/19  11:40    


 


WBC Morphology  Not Reportable   12/31/19  11:40    


 


Hypersegmented Neuts  Not Reportable   12/31/19  11:40    


 


Hyposegmented Neuts  Not Reportable   12/31/19  11:40    


 


Hypogranular Neuts  Not Reportable   12/31/19  11:40    


 


Smudge Cells  Not Reportable   12/31/19  11:40    


 


Toxic Granulation  Not Reportable   12/31/19  11:40    


 


Toxic Vacuolation  Not Reportable   12/31/19  11:40    


 


Dohle Bodies  Not Reportable   12/31/19  11:40    


 


Pelger-Huet Anomaly  Not Reportable   12/31/19  11:40    


 


Clara Rods  Not Reportable   12/31/19  11:40    


 


Platelet Estimate  Consistent w auto   12/31/19  11:40    


 


Clumped Platelets  Not Reportable   12/31/19  11:40    


 


Plt Clumps, EDTA  Not Reportable   12/31/19  11:40    


 


Large Platelets  Not Reportable   12/31/19  11:40    


 


Giant Platelets  Not Reportable   12/31/19  11:40    


 


Platelet Satelliting  Not Reportable   12/31/19  11:40    


 


Plt Morphology Comment  Not Reportable   12/31/19  11:40    


 


RBC Morphology  Not Reportable   12/31/19  11:40    


 


Dimorphic RBCs  Not Reportable   12/31/19  11:40    


 


Polychromasia  Not Reportable   12/31/19  11:40    


 


Hypochromasia  Few   12/31/19  11:40    


 


Poikilocytosis  Few   12/31/19  11:40    


 


Anisocytosis  Few   12/31/19  11:40    


 


Microcytosis  Not Reportable   12/31/19  11:40    


 


Macrocytosis  Not Reportable   12/31/19  11:40    


 


Spherocytes  Not Reportable   12/31/19  11:40    


 


Pappenheimer Bodies  Not Reportable   12/31/19  11:40    


 


Sickle Cells  Not Reportable   12/31/19  11:40    


 


Target Cells  Few   12/31/19  11:40    


 


Tear Drop Cells  Not Reportable   12/31/19  11:40    


 


Ovalocytes  Not Reportable   12/31/19  11:40    


 


Helmet Cells  Not Reportable   12/31/19  11:40    


 


[place] Bodies  Not Reportable   12/31/19  11:40    


 


Cabot Rings  Not Reportable   12/31/19  11:40    


 


[place] Cells  Not Reportable   12/31/19  11:40    


 


Bite Cells  Not Reportable   12/31/19  11:40    


 


Crenated Cell  Not Reportable   12/31/19  11:40    


 


Elliptocytes  Not Reportable   12/31/19  11:40    


 


Acanthocytes (Spur)  Not Reportable   12/31/19  11:40    


 


Rouleaux  Not Reportable   12/31/19  11:40    


 


Hemoglobin C Crystals  Not Reportable   12/31/19  11:40    


 


Schistocytes  Not Reportable   12/31/19  11:40    


 


Malaria parasites  Not Reportable   12/31/19  11:40    


 


Miah Bodies  Not Reportable   12/31/19  11:40    


 


Hem Pathologist Commnt  No   12/31/19  11:40    


 


Sodium  138 mmol/L (137-145)   01/01/20  05:27    


 


Potassium  5.1 mmol/L (3.6-5.0)  H  01/01/20  05:27    


 


Chloride  102.5 mmol/L ()   01/01/20  05:27    


 


Carbon Dioxide  25 mmol/L (22-30)   01/01/20  05:27    


 


Anion Gap  16 mmol/L  01/01/20  05:27    


 


BUN  19 mg/dL (7-17)  H  01/01/20  05:27    


 


Creatinine  4.2 mg/dL (0.7-1.2)  H  01/01/20  05:27    


 


Estimated GFR  12 ml/min  01/01/20  05:27    


 


BUN/Creatinine Ratio  5 %  01/01/20  05:27    


 


Glucose  92 mg/dL ()   01/01/20  05:27    


 


POC Glucose  79  ()   01/01/20  06:27    


 


Calcium  8.0 mg/dL (8.4-10.2)  L  01/01/20  05:27    


 


Phosphorus  4.70 mg/dL (2.5-4.5)  H  01/01/20  05:27    


 


Total Bilirubin  0.40 mg/dL (0.1-1.2)   12/31/19  11:40    


 


AST  28 units/L (5-40)   12/31/19  11:40    


 


ALT  11 units/L (7-56)   12/31/19  11:40    


 


Alkaline Phosphatase  87 units/L ()   12/31/19  11:40    


 


Troponin T  0.046 ng/mL (0.00-0.029)  H D 12/31/19  19:48    


 


Total Protein  6.1 g/dL (6.3-8.2)  L  12/31/19  11:40    


 


Albumin  3.2 g/dL (3.9-5)  L  12/31/19  11:40    


 


Albumin/Globulin Ratio  1.1 %  12/31/19  11:40    


 


Triglycerides  103 mg/dL (2-149)   12/31/19  11:40    


 


Cholesterol  264 mg/dL ()  H  12/31/19  11:40    


 


LDL Cholesterol Direct  182 mg/dL ()  H  12/31/19  11:40    


 


HDL Cholesterol  62 mg/dL (40-59)  H  12/31/19  11:40    


 


Cholesterol/HDL Ratio  4.25 %  12/31/19  11:40    


 


Lipase  14 units/L (13-60)   12/31/19  11:40    


 


TSH  3.220 mlU/mL (0.270-4.200)   12/31/19  19:48    


 


Free T4  1.06 ng/dL (0.76-1.46)   12/31/19  19:48    


 


Thyroxine (T4)  6.5 ug/dL (4.0-12.0)   12/31/19  19:48    


 


Urine Color  Yellow  (Yellow)   12/31/19  13:06    


 


Urine Turbidity  Clear  (Clear)   12/31/19  13:06    


 


Urine pH  9.0  (5.0-7.0)  H  12/31/19  13:06    


 


Ur Specific Gravity  1.011  (1.003-1.030)   12/31/19  13:06    


 


Urine Protein  >2000 mg dl mg/dL (Negative)   12/31/19  13:06    


 


Urine Glucose (UA)  50 mg/dL (Negative)   12/31/19  13:06    


 


Urine Ketones  Tr mg/dL (Negative)   12/31/19  13:06    


 


Urine Blood  Neg  (Negative)   12/31/19  13:06    


 


Urine Nitrite  Neg  (Negative)   12/31/19  13:06    


 


Urine Bilirubin  Neg  (Negative)   12/31/19  13:06    


 


Urine Urobilinogen  < 2.0 mg/dL (<2.0)   12/31/19  13:06    


 


Ur Leukocyte Esterase  Neg  (Negative)   12/31/19  13:06    


 


Urine WBC (Auto)  1.0 /HPF (0.0-6.0)   12/31/19  13:06    


 


Urine RBC (Auto)  1.0 /HPF (0.0-6.0)   12/31/19  13:06    


 


Hyaline Casts  1 /LPF  12/31/19  13:06    


 


Hepatitis A IgM Ab  Non-reactive  (NonReactive)   12/31/19  19:48    


 


Hep Bs Antigen  Non-reactive  (Negative)   12/31/19  19:48    


 


Hep B Core IgM Ab  Non-reactive  (NonReactive)   12/31/19  19:48    


 


Hepatitis C Antibody  Non-reactive  (NonReactive)   12/31/19  19:48    














Active Medications





- Current Medications


Current Medications: 














Generic Name Dose Route Start Last Admin





  Trade Name Freq  PRN Reason Stop Dose Admin


 


Acetaminophen  650 mg  12/31/19 18:55 





  Tylenol  PO  





  Q4H PRN  





  Pain, Mild (1-3)  


 


Albuterol  2.5 mg  12/31/19 19:07 





  Proventil  IH  





  Q4HRT PRN  





  Shortness Of Breath  


 


Aspirin  300 mg  01/01/20 10:00 





  Aspirin  CA  





  QDAY LUCAS  


 


Bisacodyl  10 mg  12/31/19 18:55 





  Dulcolax  CA  





  QDAY PRN  





  Constipation  


 


Dextrose  50 ml  12/31/19 19:04 





  D50w (25gm) Syringe  IV  





  Q30MIN PRN  





  Hypoglycemia  





  Protocol  


 


Dextrose/Sodium Chloride  1,000 mls @ 42 mls/hr  12/31/19 20:00  12/31/19 20:37





  D5/0.45ns  IV   42 mls/hr





  AS DIRECT LUCAS   Administration


 


Sodium Chloride  100 mls @ 999 mls/hr  12/31/19 19:58 





  Nacl 0.9%  IV  





  ARGELIA PRN  





  Hypotension  


 


Insulin Human Regular  0 units  01/01/20 00:00  01/01/20 06:28





  Humulin R  SUB-Q   Not Given





  Q6HR [facility]  





  Protocol  


 


Magnesium Hydroxide  30 ml  12/31/19 18:55 





  Milk Of Magnesia  PO  





  Q4H PRN  





  Constipation  


 


Metoclopramide HCl  10 mg  12/31/19 18:55 





  Reglan  PO  





  Q6H PRN  





  Nausea And Vomiting  


 


Ondansetron HCl  4 mg  12/31/19 18:55 





  Zofran  IV  





  Q8H PRN  





  Nausea And Vomiting  


 


Pantoprazole Sodium  40 mg  01/01/20 10:00 





  Protonix  IV  





  QDAY LUCAS  


 


Promethazine HCl  25 mg  12/31/19 18:55 





  Phenergan  CA  





  Q6H PRN  





  Nausea And Vomiting  


 


Sodium Chloride  10 ml  12/31/19 18:55 





  Sodium Chloride Flush Syringe 10 Ml  IV  





  PRN PRN  





  LINE FLUSH

## 2020-01-01 NOTE — CONSULTATION
History of Present Illness


Consult date: 01/01/20


Reason for Consult: Slurred speech, AMS


Chief complaint: 





Slurred speech, AMS


History of present illness: 


Patient is an 78 y/o woman w/ a h/o ESRD on HD, DM, HTN, HLD, hypothyroidism. 

Her son last spoke to her on Sunday, as she calls him every night. Patient did 

not call her son on Monday. When he went to check on her on Tuesday, he noted 

that she was laying in bed confused, and her speech was slurred. Her son noted 

that her glucose was in the 70s, and tried to replace it, however there was no 

improvement in her symptoms with increase in glucose. Paramedics were called, 

and when they arrived, BG was in the 60s. Patient was then brought to [facility]. 

Since yesterday, her dysarthria and confusion have improved, however son notes 

that her speech is still somewhat slow, and she has to think longer than usual 

before answering any questions, which is not normal for her. 





Past History


Past Medical History: diabetes, dialysis, ESRD, hypertension, hypothyroidism


Social history: Lives alone


Family history: no significant family history





Medications and Allergies


                                    Allergies











Allergy/AdvReac Type Severity Reaction Status Date / Time


 


No Known Allergies Allergy   Unverified 12/31/19 11:23











                                Home Medications











 Medication  Instructions  Recorded  Confirmed  Last Taken  Type


 


ALBUTEROL Inhaler (OR & NICU) 2 puff IH QID PRN 12/31/19 12/31/19 Unknown 

History





[ProAir HFA Inhaler]     


 


AtorvaSTATin [Lipitor] 40 mg PO QHS 12/31/19 12/31/19 Unknown History


 


Hydralazine HCl 50 mg PO TID 12/31/19 12/31/19 Unknown History


 


Insulin Glargine [Lantus VIAL] 20 unit SUB-Q QHS 12/31/19 12/31/19 Unknown 

History


 


Levothyroxine [Synthroid] 50 mcg PO QAM 12/31/19 12/31/19 Unknown History


 


Losartan [Cozaar] 50 mg PO QDAY 12/31/19 12/31/19 Unknown History


 


Metoprolol [Lopressor] 25 mg PO BID 12/31/19 12/31/19 Unknown History


 


NIFEdipine [Nifedipine ER] 90 mg PO DAILY 12/31/19 12/31/19 Unknown History


 


Pantoprazole [Protonix] 40 mg PO QAM 12/31/19 12/31/19 Unknown History


 


Potassium Chloride [K-Dur] 20 meq PO BID 12/31/19 12/31/19 Unknown History


 


amLODIPine [Norvasc] 10 mg PO DAILY 12/31/19 12/31/19 Unknown History


 


carvediloL [Coreg] 6.25 mg PO BID 12/31/19 12/31/19 Unknown History


 


lisinopriL [Zestril] 20 mg PO QDAY 12/31/19 12/31/19 Unknown History











Active Meds: 


Active Medications





Acetaminophen (Tylenol)  650 mg PO Q4H PRN


   PRN Reason: Pain, Mild (1-3)


Albuterol (Proventil)  2.5 mg IH Q4HRT PRN


   PRN Reason: Shortness Of Breath


Aspirin (Aspirin)  300 mg MS QDAY LUCAS


Bisacodyl (Dulcolax)  10 mg MS QDAY PRN


   PRN Reason: Constipation


Dextrose (D50w (25gm) Syringe)  50 ml IV Q30MIN PRN; Protocol


   PRN Reason: Hypoglycemia


Dextrose/Sodium Chloride (D5/0.45ns)  1,000 mls @ 42 mls/hr IV AS DIRECT LUCAS


   Last Admin: 12/31/19 20:37 Dose:  42 mls/hr


   Documented by: 


Sodium Chloride (Nacl 0.9%)  100 mls @ 999 mls/hr IV ARGELIA PRN


   PRN Reason: Hypotension


Insulin Human Regular (Humulin R)  0 units SUB-Q Q6HR [facility]; Protocol


   Last Admin: 01/01/20 06:28 Dose:  Not Given


   Documented by: 


Magnesium Hydroxide (Milk Of Magnesia)  30 ml PO Q4H PRN


   PRN Reason: Constipation


Metoclopramide HCl (Reglan)  10 mg PO Q6H PRN


   PRN Reason: Nausea And Vomiting


Ondansetron HCl (Zofran)  4 mg IV Q8H PRN


   PRN Reason: Nausea And Vomiting


Pantoprazole Sodium (Protonix)  40 mg IV QDAY LCUAS


Promethazine HCl (Phenergan)  25 mg MS Q6H PRN


   PRN Reason: Nausea And Vomiting


Sodium Chloride (Sodium Chloride Flush Syringe 10 Ml)  10 ml IV PRN PRN


   PRN Reason: LINE FLUSH











Review of Systems


All systems: negative


Neurological: change in speech, change in mentation





Physical Examination





- Vital Signs


Vital Signs: 


                                   Vital Signs











Temp Pulse Resp BP Pulse Ox


 


 97.2 F L  83   18   168/87   97 


 


 12/31/19 11:12  12/31/19 11:12  12/31/19 11:12  12/31/19 11:12  12/31/19 11:12














- Physical Exam


Narrative exam: 


Patient is awake, alert, oriented 4, follows complex commands.  No dysarthria 

or aphasia noted.  PERRL, EOMI, VFF, no facial weakness noted, bilaterally 

intact light touch, tongue midline.  Noted to have 4/5 strength in RUE/RLE 

limited due to chronic pain, 5.5 in LUE/LLE.  Bilaterally intact to light touch 

in all extremities.  Bilaterally intact to finger to nose and heel to shin.  2+ 

reflexes throughout.








- Constitutional


General appearance: comfortable





- EENT


EENT: Present: ATNC, PERRL, mucous membranes moist, hearing intact, vision 

intact





- Respiratory


Respiratory: Present: lungs clear, normal breath sounds





- Cardiovascular


Cardiovascular: Present: regular rate, normal S1, normal S2


Extremities: Present: no clubbing, cyanosis, no inflammation





- Gastrointestinal


Gastrointestinal: Present: normoactive bowel sounds, soft, non-tender





- Integumentary


Integumentary: Present: normal





- Musculoskeletal


Musculoskeletal: Present: pain in joint





- Psychiatric


Psychiatric: Present: mood/affect appropriate





- Level of Consciousness


1a. Level of Consciousness: alert/keenly responsive





- LOC Questions


1b. LOC Questions: answers both correctly





- LOC Command


1c. LOC Commands: performs tasks correctly





- Best Gaze


2. Best Gaze: normal





- Visual


3. Visual: no visual loss





- Facial Palsy


4. Facial Palsy: normal symmetrical movement





- Motor Arm


5a. Motor Arm Left: no drift


5b. Motor Arm Right: drift (due to chronic pain)





- Motor Leg


6a. Motor Leg Left: no drift


6b. Motor Leg Right: drift (due to chronic pain)





- Limb Ataxia


7. Limb Ataxia: absent





- Sensory


8. Sensory: normal





- Best Language


9. Best Language: no aphasia





- Dysarthria


10. Dysarthria: normal





- Extinction and Inattention


11. Extinction/Inattention: no abnormality





- Scoring


Total Score: 2


Stroke Severity: Minor Stroke





Results





- Laboratory Findings


CBC and BMP: 


                                 01/01/20 05:27





                                 01/01/20 05:27


Abnormal Lab Findings: 


                                  Abnormal Labs











  12/31/19 12/31/19 12/31/19





  11:30 11:40 11:40


 


RBC   5.57 H 


 


Hct   46.4 H 


 


MCH   26 L 


 


RDW   16.6 H 


 


Seg Neuts % (Manual)   91.0 H 


 


Lymphocytes % (Manual)   7.0 L 


 


Nucleated RBC %   1.0 H 


 


Lymphocytes # (Manual)   0.5 L 


 


Potassium    5.1 H


 


BUN   


 


Creatinine    3.9 H


 


POC Glucose  60 L  


 


Calcium   


 


Phosphorus   


 


Troponin T   


 


Total Protein    6.1 L


 


Albumin    3.2 L


 


Cholesterol   


 


LDL Cholesterol Direct   


 


HDL Cholesterol   


 


Urine pH   














  12/31/19 12/31/19 12/31/19





  11:40 12:45 13:06


 


RBC   


 


Hct   


 


MCH   


 


RDW   


 


Seg Neuts % (Manual)   


 


Lymphocytes % (Manual)   


 


Nucleated RBC %   


 


Lymphocytes # (Manual)   


 


Potassium   


 


BUN   


 


Creatinine   


 


POC Glucose   169 H 


 


Calcium   


 


Phosphorus   


 


Troponin T  0.042 H  


 


Total Protein   


 


Albumin   


 


Cholesterol  264 H  


 


LDL Cholesterol Direct  182 H  


 


HDL Cholesterol  62 H  


 


Urine pH    9.0 H














  12/31/19 01/01/20 01/01/20





  19:48 05:27 05:27


 


RBC   


 


Hct   


 


MCH   26 L 


 


RDW   16.5 H 


 


Seg Neuts % (Manual)   


 


Lymphocytes % (Manual)   


 


Nucleated RBC %   


 


Lymphocytes # (Manual)   


 


Potassium    5.1 H


 


BUN    19 H


 


Creatinine    4.2 H


 


POC Glucose   


 


Calcium    8.0 L


 


Phosphorus    4.70 H


 


Troponin T  0.046 H D  


 


Total Protein   


 


Albumin   


 


Cholesterol   


 


LDL Cholesterol Direct   


 


HDL Cholesterol   


 


Urine pH   














Assessment and Plan


Patient is an 78 y/o woman w/ a h/o ESRD on HD, DM, HTN, HLD, hypothyroidism, 

who p/w altered mental status and dysarthria. According to the patient's 

clinical findings, it is possible that she has had a TIA. Alternatively, her 

symptoms may have been due to hypoglycemia as glucose was in the 60's when EMS 

arrived. 





Plan:


1. TIA vs. stroke vs. hypoglycemic episode:


- Check MRI brain


- MRA head/neck


- Cont. ASA


- Cont. statin. . Goal statin <70. 


- Telemetry monitoring while in house


- PT/OT/ST


- DVT Ppx: recommend lovenox


- UA not indicative of UTI





2. Hypertension:


- Recommend target normotension, as it NIHSS is now 0.





Will continue to monitor patient.





Thank you for allowing me to take part in the care of this patient.





Jules Meda MD


Neurology

## 2020-01-01 NOTE — CONSULTATION
History of Present Illness


Consult date: 01/01/20


Requesting physician: OPAL VAUGHN


Consult reason: other (abnormal EKG)


History of present illness: 


Ms. Taveras is an 78 y/o  woman with a medical history 

significant for hypertension, diabetes, ESRD on HD and hypothyroidism who 

presented to [facility] with altered mental status.  She is a poor historian; HPI is 

therefore obtained from the EMR.  Per the chart, her son reported that his 

mother normally calls him to administer Lantus if her blood sugar is greater 

than 250; however, he had not heard from her in several days and when he checked

on her, he reported slurred speech and confusion.  She had similar symptoms two 

to three months ago related to hypoglycemia, so he brought her to the ED.  Her 

BS at that time was in the 70s and when the son attempted to feed her juice and 

yogurt, she vomited.  Staff in the ED report intermittent agitation and 

confusion and she also failed a swallowing screen.  A CT of the head was NAF, 

but there was still suspicion for a CVA, so an MRA and MRI are pending.  

Troponins elevated to 0.027 and 0.046.  EKG abnormal, showing ST-T changes 

suggestive of ischemia.  On examination during dialysis, she is alert and 

oriented x3; however, she does not know why she is in the hospital and denies 

any CP and SOB.  She is not known to our practice and there are no records of 

any prior cardiac workup.      





An echocardiogram on 12/31/19 found an EF of 30 to 35 percent, moderate LVH, 

mild MR, mild AR.








Past History


Past Medical History: diabetes, dialysis, ESRD, hypertension, hypothyroidism


Social history: Lives alone


Family history: no significant family history





Medications and Allergies


                                    Allergies











Allergy/AdvReac Type Severity Reaction Status Date / Time


 


No Known Allergies Allergy   Unverified 12/31/19 11:23











                                Home Medications











 Medication  Instructions  Recorded  Confirmed  Last Taken  Type


 


ALBUTEROL Inhaler (OR & NICU) 2 puff IH QID PRN 12/31/19 12/31/19 Unknown 

History





[ProAir HFA Inhaler]     


 


AtorvaSTATin [Lipitor] 40 mg PO QHS 12/31/19 12/31/19 Unknown History


 


Hydralazine HCl 50 mg PO TID 12/31/19 12/31/19 Unknown History


 


Insulin Glargine [Lantus VIAL] 20 unit SUB-Q QHS 12/31/19 12/31/19 Unknown 

History


 


Levothyroxine [Synthroid] 50 mcg PO QAM 12/31/19 12/31/19 Unknown History


 


Losartan [Cozaar] 50 mg PO QDAY 12/31/19 12/31/19 Unknown History


 


Metoprolol [Lopressor] 25 mg PO BID 12/31/19 12/31/19 Unknown History


 


NIFEdipine [Nifedipine ER] 90 mg PO DAILY 12/31/19 12/31/19 Unknown History


 


Pantoprazole [Protonix] 40 mg PO QAM 12/31/19 12/31/19 Unknown History


 


Potassium Chloride [K-Dur] 20 meq PO BID 12/31/19 12/31/19 Unknown History


 


amLODIPine [Norvasc] 10 mg PO DAILY 12/31/19 12/31/19 Unknown History


 


carvediloL [Coreg] 6.25 mg PO BID 12/31/19 12/31/19 Unknown History


 


lisinopriL [Zestril] 20 mg PO QDAY 12/31/19 12/31/19 Unknown History











Active Meds: 


Active Medications





Acetaminophen (Tylenol)  650 mg PO Q4H PRN


   PRN Reason: Pain, Mild (1-3)


Albuterol (Proventil)  2.5 mg IH Q4HRT PRN


   PRN Reason: Shortness Of Breath


Aspirin (Aspirin)  300 mg ID QDAY LUCAS


Atorvastatin Calcium (Lipitor)  80 mg PO QHS LUCAS


Bisacodyl (Dulcolax)  10 mg ID QDAY PRN


   PRN Reason: Constipation


Dextrose (D50w (25gm) Syringe)  50 ml IV Q30MIN PRN; Protocol


   PRN Reason: Hypoglycemia


Dextrose/Sodium Chloride (D5/0.45ns)  1,000 mls @ 42 mls/hr IV AS DIRECT LUCAS


   Last Admin: 12/31/19 20:37 Dose:  42 mls/hr


   Documented by: 


Sodium Chloride (Nacl 0.9%)  100 mls @ 999 mls/hr IV ARGELIA PRN


   PRN Reason: Hypotension


Insulin Human Regular (Humulin R)  0 units SUB-Q Q6HR [facility]; Protocol


   Last Admin: 01/01/20 16:18 Dose:  Not Given


   Documented by: 


Magnesium Hydroxide (Milk Of Magnesia)  30 ml PO Q4H PRN


   PRN Reason: Constipation


Metoclopramide HCl (Reglan)  10 mg PO Q6H PRN


   PRN Reason: Nausea And Vomiting


Ondansetron HCl (Zofran)  4 mg IV Q8H PRN


   PRN Reason: Nausea And Vomiting


Pantoprazole Sodium (Protonix)  40 mg IV QDAY LUCAS


Promethazine HCl (Phenergan)  25 mg ID Q6H PRN


   PRN Reason: Nausea And Vomiting


Sodium Chloride (Sodium Chloride Flush Syringe 10 Ml)  10 ml IV PRN PRN


   PRN Reason: LINE FLUSH











Physical Examination


                                   Vital Signs











Temp Pulse Resp BP Pulse Ox


 


 97.2 F L  83   18   168/87   97 


 


 12/31/19 11:12  12/31/19 11:12  12/31/19 11:12  12/31/19 11:12  12/31/19 11:12











General appearance: no acute distress


HEENT: Positive: PERRL


Neck: Positive: neck supple


Cardiac: Positive: Reg Rate and Rhythm


Lungs: Positive: Normal Exam


Neuro: Positive: Other (confused)


Abdomen: Positive: Unremarkable


Female genitourinary: deferred


Skin: Positive: Clear


Musculoskeletal: other (ARIEL)


Extremities: Present: normal





Results





                                 01/01/20 05:27





                                 01/01/20 05:27


                                       CBC











  01/01/20 Range/Units





  05:27 


 


WBC  5.8  (4.5-11.0)  K/mm3


 


RBC  4.66  (3.65-5.03)  M/mm3


 


Hgb  12.0  (10.1-14.3)  gm/dl


 


Hct  38.8  D  (30.3-42.9)  %


 


Plt Count  351  (140-440)  K/mm3








                          Comprehensive Metabolic Panel











  01/01/20 Range/Units





  05:27 


 


Sodium  138  (137-145)  mmol/L


 


Potassium  5.1 H  (3.6-5.0)  mmol/L


 


Chloride  102.5  ()  mmol/L


 


Carbon Dioxide  25  (22-30)  mmol/L


 


BUN  19 H  (7-17)  mg/dL


 


Creatinine  4.2 H  (0.7-1.2)  mg/dL


 


Glucose  92  ()  mg/dL


 


Calcium  8.0 L  (8.4-10.2)  mg/dL














- Imaging and Cardiology


Echo: report reviewed (12/31/19: EF of 30-35%, moderate LVH, mild MR, mild AR.)


EKG: report reviewed (SR with ST-T changes suggestive of ischemia)





EKG interpretations





- EKG


Sinus rhythms and dysrhythmias: sinus rhythm


Repolarization changes or abnormalities: ST or T wave suggestive of ischemia





Assessment and Plan


Ms. Taveras is an 78 y/o female admitted with altered mental status and 

possible CVA.  Troponins mildly elevated, which is likely r/t ESRD; however, 

given abnormal EKG, will schedule Lexiscan stress test when medically stable.  

Continue ASA, statin, beta blocker and ARB.  Further recommendations pending 

hospital course.  Continue other current cardiac management for now.    





The patient has been seen in conjunction with Dr. Pham, who agrees with the 

assessment and plan.














- Patient Problems


(1) Abnormal EKG


Current Visit: Yes   Status: Acute   





(2) Cardiomyopathy


Current Visit: Yes   Status: Acute   


Qualifiers: 


   Cardiomyopathy type: unspecified   Qualified Code(s): I42.9 - Cardiomyopathy,

 unspecified   





(3) Elevated troponin


Current Visit: Yes   Status: Acute   





(4) CVA (cerebral vascular accident)


Current Visit: Yes   Status: Suspected   





(5) Altered mental state


Current Visit: Yes   Status: Acute   





(6) Diabetes


Current Visit: Yes   Status: Acute   





(7) ESRD on dialysis


Current Visit: Yes   Status: Acute   





(8) Hypothyroidism


Current Visit: Yes   Status: Chronic

## 2020-01-01 NOTE — PROGRESS NOTE
Assessment and Plan


Acute Metabolic Encephalopathy, ? CVA


ESRD on HD


HTN


DM Type 2 on insulin


Hypothyroidism


Hyperkalemia


Osteoarthritis of right hip and right knee


Elevated Troponin








Plan:





- HD today for gentle UF and clearance, goal UF 0-1 liter, will try to avoid 

hypotension during HD


- Assess need for HD on daily basis


- CT Head showed no acute finding, MRI Brain, MRA Head, carotid dopplers pending


- Permissive HTN, s/p ASA, neurology consulted


- Holding BP medications for now


- Cardiology consulted for possible lateral ischemia


- Renally dose meds


- This pt undergoes outpatient HD at [facility] every TTS


- Renal plan d/w Dr Adkins








Subjective


Date of service: 01/01/20





Objective





- Exam


Narrative Exam: 


General appearance: well-developed


EENT: ATNC


Neck: Present: neck supple


Respiratory: Decreased Breath Sounds


Heart: regular, S1S2, other (ACCESS: Right IJ Perm Catheter intact)


Gastrointestinal: Present: normoactive bowel sounds.  Absent: tenderness


Integumentary: warm and dry


Neurologic: alert and oriented x3


Musculoskeletal: Present: other (no edema to BLE)


Psychiatric: mood/affect appropriate, cooperative





- Vital Signs


Vital signs: 


                               Vital Signs - 12hr











  01/01/20 01/01/20 01/01/20





  01:03 01:04 01:32


 


Temperature 98.0 F  97.6 F


 


Pulse Rate 71 66 76


 


Respiratory 18  22





Rate   


 


Blood Pressure 116/77  143/80


 


O2 Sat by Pulse 96 96 98





Oximetry   














  01/01/20





  07:28


 


Temperature 97.7 F


 


Pulse Rate 75


 


Respiratory 20





Rate 


 


Blood Pressure 164/89


 


O2 Sat by Pulse 97





Oximetry 














- Lab





                                 01/01/20 05:27





                                 01/01/20 05:27


                             Most recent lab results











Calcium  8.0 mg/dL (8.4-10.2)  L  01/01/20  05:27    


 


Phosphorus  4.70 mg/dL (2.5-4.5)  H  01/01/20  05:27    














Medications & Allergies





- Medications


Allergies/Adverse Reactions: 


                                    Allergies





No Known Allergies Allergy (Unverified 12/31/19 11:23)


   








Home Medications: 


                                Home Medications











 Medication  Instructions  Recorded  Confirmed  Last Taken  Type


 


ALBUTEROL Inhaler (OR & NICU) 2 puff IH QID PRN 12/31/19 12/31/19 Unknown 

History





[ProAir HFA Inhaler]     


 


AtorvaSTATin [Lipitor] 40 mg PO QHS 12/31/19 12/31/19 Unknown History


 


Hydralazine HCl 50 mg PO TID 12/31/19 12/31/19 Unknown History


 


Insulin Glargine [Lantus VIAL] 20 unit SUB-Q QHS 12/31/19 12/31/19 Unknown 

History


 


Levothyroxine [Synthroid] 50 mcg PO QAM 12/31/19 12/31/19 Unknown History


 


Losartan [Cozaar] 50 mg PO QDAY 12/31/19 12/31/19 Unknown History


 


Metoprolol [Lopressor] 25 mg PO BID 12/31/19 12/31/19 Unknown History


 


NIFEdipine [Nifedipine ER] 90 mg PO DAILY 12/31/19 12/31/19 Unknown History


 


Pantoprazole [Protonix] 40 mg PO QAM 12/31/19 12/31/19 Unknown History


 


Potassium Chloride [K-Dur] 20 meq PO BID 12/31/19 12/31/19 Unknown History


 


amLODIPine [Norvasc] 10 mg PO DAILY 12/31/19 12/31/19 Unknown History


 


carvediloL [Coreg] 6.25 mg PO BID 12/31/19 12/31/19 Unknown History


 


lisinopriL [Zestril] 20 mg PO QDAY 12/31/19 12/31/19 Unknown History











Active Medications: 














Generic Name Dose Route Start Last Admin





  Trade Name Freq  PRN Reason Stop Dose Admin


 


Acetaminophen  650 mg  12/31/19 18:55 





  Tylenol  PO  





  Q4H PRN  





  Pain, Mild (1-3)  


 


Albuterol  2.5 mg  12/31/19 19:07 





  Proventil  IH  





  Q4HRT PRN  





  Shortness Of Breath  


 


Aspirin  300 mg  01/01/20 10:00 





  Aspirin  KS  





  QDAY LUCAS  


 


Bisacodyl  10 mg  12/31/19 18:55 





  Dulcolax  KS  





  QDAY PRN  





  Constipation  


 


Dextrose  50 ml  12/31/19 19:04 





  D50w (25gm) Syringe  IV  





  Q30MIN PRN  





  Hypoglycemia  





  Protocol  


 


Dextrose/Sodium Chloride  1,000 mls @ 42 mls/hr  12/31/19 20:00  12/31/19 20:37





  D5/0.45ns  IV   42 mls/hr





  AS DIRECT LUCAS   Administration


 


Sodium Chloride  100 mls @ 999 mls/hr  12/31/19 19:58 





  Nacl 0.9%  IV  





  ARGELIA PRN  





  Hypotension  


 


Insulin Human Regular  0 units  01/01/20 00:00  01/01/20 06:28





  Humulin R  SUB-Q   Not Given





  Q6HR [facility]  





  Protocol  


 


Magnesium Hydroxide  30 ml  12/31/19 18:55 





  Milk Of Magnesia  PO  





  Q4H PRN  





  Constipation  


 


Metoclopramide HCl  10 mg  12/31/19 18:55 





  Reglan  PO  





  Q6H PRN  





  Nausea And Vomiting  


 


Ondansetron HCl  4 mg  12/31/19 18:55 





  Zofran  IV  





  Q8H PRN  





  Nausea And Vomiting  


 


Pantoprazole Sodium  40 mg  01/01/20 10:00 





  Protonix  IV  





  QDAY [facility]  


 


Promethazine HCl  25 mg  12/31/19 18:55 





  Phenergan  KS  





  Q6H PRN  





  Nausea And Vomiting  


 


Sodium Chloride  10 ml  12/31/19 18:55 





  Sodium Chloride Flush Syringe 10 Ml  IV  





  PRN PRN  





  LINE FLUSH

## 2020-01-02 LAB
BUN SERPL-MCNC: 16 MG/DL (ref 7–17)
BUN/CREAT SERPL: 4 %
CALCIUM SERPL-MCNC: 7.7 MG/DL (ref 8.4–10.2)
HCT VFR BLD CALC: 35.1 % (ref 30.3–42.9)
HEMOLYSIS INDEX: 11
HGB BLD-MCNC: 11.1 GM/DL (ref 10.1–14.3)
MCHC RBC AUTO-ENTMCNC: 32 % (ref 30–34)
MCV RBC AUTO: 82 FL (ref 79–97)
PLATELET # BLD: 193 K/MM3 (ref 140–440)
RBC # BLD AUTO: 4.28 M/MM3 (ref 3.65–5.03)

## 2020-01-02 RX ADMIN — ASPIRIN SCH MG: 300 SUPPOSITORY RECTAL at 11:40

## 2020-01-02 RX ADMIN — HEPARIN SODIUM SCH UNIT: 5000 INJECTION, SOLUTION INTRAVENOUS; SUBCUTANEOUS at 11:17

## 2020-01-02 RX ADMIN — LEVOTHYROXINE SODIUM SCH MCG: 0.05 TABLET ORAL at 11:17

## 2020-01-02 RX ADMIN — ACETAMINOPHEN PRN MG: 325 TABLET ORAL at 04:35

## 2020-01-02 RX ADMIN — INSULIN HUMAN SCH: 100 INJECTION, SOLUTION PARENTERAL at 15:26

## 2020-01-02 RX ADMIN — INSULIN HUMAN SCH UNITS: 100 INJECTION, SOLUTION PARENTERAL at 18:10

## 2020-01-02 RX ADMIN — PANTOPRAZOLE SODIUM SCH MG: 40 INJECTION, POWDER, FOR SOLUTION INTRAVENOUS at 11:16

## 2020-01-02 RX ADMIN — HEPARIN SODIUM SCH UNIT: 5000 INJECTION, SOLUTION INTRAVENOUS; SUBCUTANEOUS at 21:34

## 2020-01-02 RX ADMIN — INSULIN HUMAN SCH UNITS: 100 INJECTION, SOLUTION PARENTERAL at 06:27

## 2020-01-02 RX ADMIN — METOPROLOL TARTRATE SCH MG: 25 TABLET, FILM COATED ORAL at 21:34

## 2020-01-02 RX ADMIN — INSULIN HUMAN SCH UNITS: 100 INJECTION, SOLUTION PARENTERAL at 00:59

## 2020-01-02 RX ADMIN — LOSARTAN POTASSIUM SCH MG: 50 TABLET, FILM COATED ORAL at 11:16

## 2020-01-02 RX ADMIN — METOPROLOL TARTRATE SCH MG: 25 TABLET, FILM COATED ORAL at 11:17

## 2020-01-02 RX ADMIN — INSULIN HUMAN SCH: 100 INJECTION, SOLUTION PARENTERAL at 23:13

## 2020-01-02 NOTE — MAGNETIC RESONANCE REPORT
MRA HEAD WITHOUT CONTRAST



HISTORY: Cerebrovascular accident.



COMPARISON: none



TECHNIQUE: Routine MRA of the head performed. 3-D/MIP reformats postprocessed.  

CONTRAST: none



FINDINGS:



MRA HEAD:



Intracranial vertebral arteries: There is mild dominance of the right vertebral artery. Both vertebra
l arteries contribute to the basilar artery origin.

Basilar artery: Basilar artery has an unremarkable appearance.

Posterior cerebral arteries: Posterior cerebral arteries are irregular in contour. Intercranial ather
osclerotic disease is suspected along the T2 and T3 segments of both posterior cerebral arteries.



Intracranial internal carotid arteries: Petrous, cavernous, clinoid and supraclinoid segments of both
 internal carotid arteries have an unremarkable appearance.

Anterior cerebral arteries: There is hypoplasia or absence of the A1 segment of the right anterior ce
rebral artery. No additional abnormality.

Middle cerebral arteries: There is a moderate stenosis of the distal M1 segment of the left middle ce
rebral artery consistent with intercranial atherosclerotic disease.







IMPRESSION:

1. Evidence of intercranial atherosclerotic changes involving both posterior cerebral arteries and di
stal M1 segment left middle cerebral artery.



Signer Name: Octavio Michael MD 

Signed: 1/2/2020 8:11 PM

 Workstation Name: [id number]

## 2020-01-02 NOTE — PROGRESS NOTE
Assessment and Plan


Patient is an 80 y/o woman w/ a h/o ESRD on HD, DM, HTN, HLD, hypothyroidism, 

who p/w altered mental status and dysarthria. According to the patient's 

clinical findings, it is possible that she has had a TIA. Alternatively, her 

symptoms may have been due to hypoglycemia as glucose was in the 60's when EMS 

arrived. 





Plan:


1. TIA vs. stroke vs. hypoglycemic episode:


- Check MRI brain - Pending


- MRA head/neck - Pending


- Echo: EF 30-35%, LA normal size, bubble study negative. 


- Cont. ASA


- Cont. statin. . Goal statin <70. 


- Telemetry monitoring while in house


- PT/OT/ST


- DVT Ppx: recommend lovenox


- UA not indicative of UTI





2. Hypertension:


- Recommend target normotension. 





Will continue to monitor patient.





Thank you for allowing me to take part in the care of this patient.





Jules Mead MD


Neurology








Subjective


Date of service: 01/02/20


Interval history: 


no acute events overnight. Speech improved from yesterday. 








Objective





- Exam


Narrative Exam: 


Patient is awake, alert, oriented 4, follows complex commands.  No dysarthria 

or aphasia noted.  PERRL, EOMI, VFF, no facial weakness noted, bilaterally 

intact light touch, tongue midline.  Noted to have 4/5 strength in RUE/RLE 

limited due to chronic pain, 5/5 in LUE/LLE.  Bilaterally intact to light touch 

in all extremities.  Bilaterally intact to finger to nose and heel to shin.  2+ 

reflexes throughout.








- Vital Sign


                               Vital Signs - 12hr











  01/02/20 01/02/20





  07:47 13:16


 


Temperature 98.4 F 98.2 F


 


Pulse Rate 62 69


 


Respiratory 18 18





Rate  


 


Blood Pressure 156/86 113/61


 


O2 Sat by Pulse 97 97





Oximetry  














- General Apperance


Constitutional: comfortable





- EENT


EENT: ATNC, PERRL, mucous membranes moist, hearing intact, vision intact





- Respiratory


Respiratory: lungs clear, normal breath sounds





- Cardiovascular


Cardiovascular: regular rate, normal S1, normal S2


Extremities: no clubbing, cyanosis, no inflammation





- Gastrointestinal


Gastrointestinal: normoactive bowel sounds, soft, non-tender





- Integumentary


Integumentary: normal





- Musculoskeletal


Musculoskeletal: pain in joint, no fluid collection





- Psychiatric


Psychiatric: mood/affect appropriate





- Laboratory Findings


CBC and BMP: 


                                 01/02/20 04:53





                                 01/02/20 04:53


Abnormal Lab Findings: 


                                  Abnormal Labs











  12/31/19 12/31/19 12/31/19





  11:30 11:40 11:40


 


RBC   5.57 H 


 


Hct   46.4 H 


 


MCH   26 L 


 


RDW   16.6 H 


 


Seg Neuts % (Manual)   91.0 H 


 


Lymphocytes % (Manual)   7.0 L 


 


Nucleated RBC %   1.0 H 


 


Lymphocytes # (Manual)   0.5 L 


 


Sodium   


 


Potassium    5.1 H


 


Carbon Dioxide   


 


BUN   


 


Creatinine    3.9 H


 


Glucose   


 


POC Glucose  60 L  


 


Calcium   


 


Phosphorus   


 


Troponin T   


 


Total Protein    6.1 L


 


Albumin    3.2 L


 


Cholesterol   


 


LDL Cholesterol Direct   


 


HDL Cholesterol   


 


Urine pH   














  12/31/19 12/31/19 12/31/19





  11:40 12:45 13:06


 


RBC   


 


Hct   


 


MCH   


 


RDW   


 


Seg Neuts % (Manual)   


 


Lymphocytes % (Manual)   


 


Nucleated RBC %   


 


Lymphocytes # (Manual)   


 


Sodium   


 


Potassium   


 


Carbon Dioxide   


 


BUN   


 


Creatinine   


 


Glucose   


 


POC Glucose   169 H 


 


Calcium   


 


Phosphorus   


 


Troponin T  0.042 H  


 


Total Protein   


 


Albumin   


 


Cholesterol  264 H  


 


LDL Cholesterol Direct  182 H  


 


HDL Cholesterol  62 H  


 


Urine pH    9.0 H














  12/31/19 01/01/20 01/01/20





  19:48 05:27 05:27


 


RBC   


 


Hct   


 


MCH   26 L 


 


RDW   16.5 H 


 


Seg Neuts % (Manual)   


 


Lymphocytes % (Manual)   


 


Nucleated RBC %   


 


Lymphocytes # (Manual)   


 


Sodium   


 


Potassium    5.1 H


 


Carbon Dioxide   


 


BUN    19 H


 


Creatinine    4.2 H


 


Glucose   


 


POC Glucose   


 


Calcium    8.0 L


 


Phosphorus    4.70 H


 


Troponin T  0.046 H D  


 


Total Protein   


 


Albumin   


 


Cholesterol   


 


LDL Cholesterol Direct   


 


HDL Cholesterol   


 


Urine pH   














  01/01/20 01/02/20 01/02/20





  19:31 01:01 04:53


 


RBC   


 


Hct   


 


MCH    26 L


 


RDW    16.1 H


 


Seg Neuts % (Manual)   


 


Lymphocytes % (Manual)   


 


Nucleated RBC %   


 


Lymphocytes # (Manual)   


 


Sodium   


 


Potassium   


 


Carbon Dioxide   


 


BUN   


 


Creatinine   


 


Glucose   


 


POC Glucose  135 H  181 H 


 


Calcium   


 


Phosphorus   


 


Troponin T   


 


Total Protein   


 


Albumin   


 


Cholesterol   


 


LDL Cholesterol Direct   


 


HDL Cholesterol   


 


Urine pH   














  01/02/20 01/02/20 01/02/20





  04:53 06:05 11:47


 


RBC   


 


Hct   


 


MCH   


 


RDW   


 


Seg Neuts % (Manual)   


 


Lymphocytes % (Manual)   


 


Nucleated RBC %   


 


Lymphocytes # (Manual)   


 


Sodium  133 L  


 


Potassium   


 


Carbon Dioxide  20 L  


 


BUN   


 


Creatinine  3.7 H  


 


Glucose  164 H  


 


POC Glucose   170 H  182 H


 


Calcium  7.7 L  


 


Phosphorus   


 


Troponin T   


 


Total Protein   


 


Albumin   


 


Cholesterol   


 


LDL Cholesterol Direct   


 


HDL Cholesterol   


 


Urine pH

## 2020-01-02 NOTE — PROGRESS NOTE
Assessment and Plan


Continue present cardiac management. 


Plan for lexiscan MPI stress test in AM to r/o ICMP. NPO after MN.    





The patient has been seen in conjunction with Dr. Siegel who agrees with the 

assessment and plan of care.





- Patient Problems


(1) Abnormal EKG


Current Visit: Yes   Status: Acute   





(2) Cardiomyopathy


Current Visit: Yes   Status: Acute   


Qualifiers: 


   Cardiomyopathy type: unspecified   Qualified Code(s): I42.9 - Cardiomyopathy,

unspecified   





(3) Elevated troponin


Current Visit: Yes   Status: Acute   





(4) CVA (cerebral vascular accident)


Current Visit: Yes   Status: Suspected   





(5) Altered mental state


Current Visit: Yes   Status: Acute   





(6) Diabetes


Current Visit: Yes   Status: Acute   





(7) ESRD on dialysis


Current Visit: Yes   Status: Acute   





(8) Hypothyroidism


Current Visit: Yes   Status: Chronic   








Subjective


Date of service: 01/02/20


Principal diagnosis: abn ekg


Interval history: 


pt resting in bed, remains confused, no current complaints. in SR HR 100s with 

freq PACs. 








Objective





                                Last Vital Signs











Temp  98.8 F   01/01/20 20:22


 


Pulse  80   01/01/20 23:05


 


Resp  18   01/01/20 22:00


 


BP  134/58   01/01/20 23:05


 


Pulse Ox  100   01/01/20 22:00

















- Physical Examination


General: Other (confused)


HEENT: Positive: PERRL


Neck: Positive: neck supple


Cardiac: Positive: Reg Rate and Rhythm, S1/S2


Lungs: Positive: Decreased Breath Sounds


Neuro: Positive: Other (confused)


Abdomen: Positive: Unremarkable


Skin: Positive: Clear


Musculoskeletal: other (ARIEL)


Extremities: Present: normal





- Labs and Meds


                                       CBC











  01/02/20 Range/Units





  04:53 


 


WBC  8.6  (4.5-11.0)  K/mm3


 


RBC  4.28  (3.65-5.03)  M/mm3


 


Hgb  11.1  (10.1-14.3)  gm/dl


 


Hct  35.1  (30.3-42.9)  %


 


Plt Count  193  (140-440)  K/mm3








                          Comprehensive Metabolic Panel











  01/02/20 Range/Units





  04:53 


 


Sodium  133 L  (137-145)  mmol/L


 


Potassium  4.4  (3.6-5.0)  mmol/L


 


Chloride  98.8  ()  mmol/L


 


Carbon Dioxide  20 L  (22-30)  mmol/L


 


BUN  16  (7-17)  mg/dL


 


Creatinine  3.7 H  (0.7-1.2)  mg/dL


 


Glucose  164 H  ()  mg/dL


 


Calcium  7.7 L  (8.4-10.2)  mg/dL














- Imaging and Cardiology


EKG: report reviewed (SR with ST-T changes suggestive of ischemia)


Echo: report reviewed (12/31/19: EF of 30-35%, moderate LVH, mild MR, mild AR.)





- EKG


Sinus rhythms and dysrhythmias: sinus rhythm


Repolarization changes or abnormalities: ST or T wave suggestive of ischemia

## 2020-01-02 NOTE — PROGRESS NOTE
Assessment and Plan


Acute Metabolic Encephalopathy, ? CVA


ESRD on HD


HTN


DM Type 2 on insulin


Hypothyroidism


Hyperkalemia


Osteoarthritis of right hip and right knee


Elevated Troponin








Plan:





- s/p HD yesterday, no HD today, HD tomorrow for gentle UF and clearance, goal 

UF 1 liter, will try to avoid hypotension during HD


- Assess need for HD on daily basis


- CT Head showed no acute finding, MRI Brain, MRA Head, carotid dopplers pending


- Permissive HTN, s/p ASA, neurology consulted


- Holding BP medications for now


- Cardiology consulted for possible lateral ischemia


- Renally dose meds


- This pt undergoes outpatient HD at [facility] every TTS








Subjective


Date of service: 01/02/20


Principal diagnosis: abn ekg


Interval history: 


s/p HD yesterday.





Objective





- Exam


Narrative Exam: 


General appearance: well-developed


EENT: ATNC


Neck: Present: neck supple


Respiratory: Decreased Breath Sounds


Heart: regular, S1S2, other (ACCESS: Right IJ Perm Catheter intact)


Gastrointestinal: Present: normoactive bowel sounds.  Absent: tenderness


Integumentary: warm and dry


Neurologic: alert and oriented x3


Musculoskeletal: Present: other (no edema to BLE)


Psychiatric: mood/affect appropriate, cooperative





- Vital Signs


Vital signs: 


                               Vital Signs - 12hr











  01/02/20 01/02/20





  07:47 13:16


 


Temperature 98.4 F 98.2 F


 


Pulse Rate 62 69


 


Respiratory 18 18





Rate  


 


Blood Pressure 156/86 113/61


 


O2 Sat by Pulse 97 97





Oximetry  














- Lab





                                 01/02/20 04:53





                                 01/02/20 04:53


                             Most recent lab results











Calcium  7.7 mg/dL (8.4-10.2)  L  01/02/20  04:53    


 


Phosphorus  4.70 mg/dL (2.5-4.5)  H  01/01/20  05:27    














Medications & Allergies





- Medications


Allergies/Adverse Reactions: 


                                    Allergies





No Known Allergies Allergy (Unverified 12/31/19 11:23)


   








Home Medications: 


                                Home Medications











 Medication  Instructions  Recorded  Confirmed  Last Taken  Type


 


ALBUTEROL Inhaler (OR & NICU) 2 puff IH QID PRN 12/31/19 12/31/19 Unknown 

History





[ProAir HFA Inhaler]     


 


AtorvaSTATin [Lipitor] 40 mg PO QHS 12/31/19 12/31/19 Unknown History


 


Hydralazine HCl 50 mg PO TID 12/31/19 12/31/19 Unknown History


 


Insulin Glargine [Lantus VIAL] 20 unit SUB-Q QHS 12/31/19 12/31/19 Unknown 

History


 


Levothyroxine [Synthroid] 50 mcg PO QAM 12/31/19 12/31/19 Unknown History


 


Losartan [Cozaar] 50 mg PO QDAY 12/31/19 12/31/19 Unknown History


 


Metoprolol [Lopressor] 25 mg PO BID 12/31/19 12/31/19 Unknown History


 


NIFEdipine [Nifedipine ER] 90 mg PO DAILY 12/31/19 12/31/19 Unknown History


 


Pantoprazole [Protonix] 40 mg PO QAM 12/31/19 12/31/19 Unknown History


 


Potassium Chloride [K-Dur] 20 meq PO BID 12/31/19 12/31/19 Unknown History


 


amLODIPine [Norvasc] 10 mg PO DAILY 12/31/19 12/31/19 Unknown History


 


carvediloL [Coreg] 6.25 mg PO BID 12/31/19 12/31/19 Unknown History


 


lisinopriL [Zestril] 20 mg PO QDAY 12/31/19 12/31/19 Unknown History











Active Medications: 














Generic Name Dose Route Start Last Admin





  Trade Name Freq  PRN Reason Stop Dose Admin


 


Acetaminophen  650 mg  12/31/19 18:55  01/02/20 04:35





  Tylenol  PO   650 mg





  Q4H PRN   Administration





  Pain, Mild (1-3)  


 


Albuterol  2.5 mg  12/31/19 19:07 





  Proventil  IH  





  Q4HRT PRN  





  Shortness Of Breath  


 


Aspirin  300 mg  01/01/20 10:00  01/02/20 11:40





  Aspirin  DE   300 mg





  QDAY LUCAS   Administration


 


Atorvastatin Calcium  80 mg  01/01/20 22:00  01/01/20 23:06





  Lipitor  PO   80 mg





  QHS LUCAS   Administration


 


Bisacodyl  10 mg  12/31/19 18:55 





  Dulcolax  DE  





  QDAY PRN  





  Constipation  


 


Dextrose  50 ml  12/31/19 19:04 





  D50w (25gm) Syringe  IV  





  Q30MIN PRN  





  Hypoglycemia  





  Protocol  


 


Heparin Sodium (Porcine)  5,000 unit  01/01/20 22:00  01/02/20 11:17





  Heparin  SUB-Q   5,000 unit





  Q12HR LUCAS   Administration


 


Dextrose/Sodium Chloride  1,000 mls @ 42 mls/hr  12/31/19 20:00  01/01/20 22:51





  D5/0.45ns  IV   42 mls/hr





  AS DIRECT LUCAS   Administration


 


Sodium Chloride  100 mls @ 999 mls/hr  12/31/19 19:58 





  Nacl 0.9%  IV  





  ARGELIA PRN  





  Hypotension  


 


Insulin Human Regular  0 units  01/01/20 00:00  01/02/20 06:27





  Humulin R  SUB-Q   1 units





  Q6HR LUCAS   Administration





  Protocol  


 


Levothyroxine Sodium  50 mcg  01/02/20 10:00  01/02/20 11:17





  Synthroid  PO   50 mcg





  QAM LUCAS   Administration


 


Losartan Potassium  50 mg  01/01/20 17:00  01/02/20 11:16





  Cozaar  PO   50 mg





  QDAY LUCAS   Administration


 


Magnesium Hydroxide  30 ml  12/31/19 18:55 





  Milk Of Magnesia  PO  





  Q4H PRN  





  Constipation  


 


Metoclopramide HCl  10 mg  12/31/19 18:55 





  Reglan  PO  





  Q6H PRN  





  Nausea And Vomiting  


 


Metoprolol Tartrate  25 mg  01/01/20 22:00  01/02/20 11:17





  Metoprolol  PO   25 mg





  BID LUCAS   Administration


 


Ondansetron HCl  4 mg  12/31/19 18:55 





  Zofran  IV  





  Q8H PRN  





  Nausea And Vomiting  


 


Pantoprazole Sodium  40 mg  01/01/20 10:00  01/02/20 11:16





  Protonix  IV   40 mg





  QDAY LUCAS   Administration


 


Promethazine HCl  25 mg  12/31/19 18:55 





  Phenergan  DE  





  Q6H PRN  





  Nausea And Vomiting  


 


Sodium Chloride  10 ml  12/31/19 18:55  01/02/20 11:17





  Sodium Chloride Flush Syringe 10 Ml  IV   10 ml





  PRN PRN   Administration





  LINE FLUSH

## 2020-01-02 NOTE — MAGNETIC RESONANCE REPORT
MRI BRAIN WITHOUT CONTRAST



INDICATION / CLINICAL INFORMATION:

stroke. Weakness.



TECHNIQUE: 

Multiplanar, multisequence MR images of the brain were obtained.



COMPARISON: 

None available.



FINDINGS:



BRAIN / INTRACRANIAL CONTENTS: Age-related parenchymal volume loss is demonstrated. Dilatation of the
 cortical sulci and ventricular system is noted in keeping with the patient's stated age of 79 years.
 Periventricular and deep white matter hyperintensities noted consistent with age-related microvascul
ar ischemic changes. There is no mass effect. No evidence of intracranial hemorrhage or extra-axial f
luid collection is seen. There is no indication of remote cortical infarction. Diffusion weighted sca
ns are negative. There is no indication of acute ischemic injury.



Microvascular ischemic changes are present within the delilah. Age appropriate cerebellar atrophy is not
ed. The cerebellum has an otherwise unremarkable appearance.



CRANIOCERVICAL JUNCTION: No abnormalities are identified at the craniocervical junction.

VASCULAR FLOW-VOIDS: Normal flow-voids are present within the major intracranial vessels.



ORBITS: Patient is status post bilateral cataract surgery. The orbits have an otherwise unremarkable 
appearance.

SINUSES / MASTOIDS: There is no indication of inflammatory disease in the paranasal sinuses or mastoi
d air cells.





IMPRESSION:

1. Age-related parenchymal volume loss and advanced microvascular ischemic change.

2. No acute intracranial abnormality.



Signer Name: Octavio Michael MD 

Signed: 1/2/2020 8:18 PM

 Workstation Name: [id number]

## 2020-01-02 NOTE — MAGNETIC RESONANCE REPORT
MRA NECK WITHOUT CONTRAST



HISTORY: Cerebrovascular accident.



COMPARISON: none



TECHNIQUE:  Routine MRA neck performed. 3-D/MIP reformats postprocessed.  Percentage stenosis is dete
rmined by direct quantitative measurements of distal internal carotid artery diameter compared with n
ormal reference segments or by criteria similar to NASCET where applicable.



CONTRAST: none



FINDINGS:





MRA NECK:

Aortic arch: Not well visualized on this MRA neck without contrast.



Vertebral arteries: Balanced vertebral arteries both contribute to the basilar artery origin. Artifac
tual flow gap is observed near the junction of the V3 and V4 segments. 





Right carotid artery:Right common carotid artery, the carotid bifurcation and cervical portions of th
e right internal carotid arteries all have an unremarkable appearance.



Left carotid artery:The left common carotid artery, left carotid bifurcation and cervical portions of
 the left internal carotid artery all have an unremarkable appearance.



SUMMARY:The degree of stenosis, if any, is determined utilizing NASCET like criteria. In this case th
ere is no indication of stenosis at the carotid bifurcations or elsewhere.





IMPRESSION:

1. No indication of hemodynamically significant stenosis at the carotid bifurcations or elsewhere.



Signer Name: Octavio Michael MD 

Signed: 1/2/2020 9:01 PM

 Workstation Name: [id number]

## 2020-01-02 NOTE — PROGRESS NOTE
Assessment and Plan


Assessment and plan: 





79-year-old woman who presents to the hospital altered mental status





CT head; no acute abnormality, nonspecific chronic white matter changes





X-rays of the right hip and right knee show osteoarthritis





Chest x-ray no acute findings





Acute metabolic encephalopathy, may be due to hypoglycemia vs CVA


Tele- neurology input appreciated, neurology consulted,


Allow permissive hypertension, has received aspirin, stroke education, observe 

for arrhythmia on telemetry, -neurology consult


--aspirin and high dose statin, check Lipid panel


MRI brain, MRA head, carotid Dopplers, echo


Bedside swallow evaluation


MRI Brain pending





History of hypothyroidism


Check thyroid function tests





Abnormal EKG


Concern for possible lateral ischemia, peak troponin is 0.04, and trending down,

as patient does not have chest pain have presentation is less likely to be 

consistent with non-STEMI


Cardiology consulted


For stress test in am





ESRD


HD per nephrologist





Diabetes


Consistent carbohydrate diet, sliding scale insulin, check A1c


-As her glucose was in the 70s and patient is n.p.o., will place her on dextrose

containing drip





Dysphagia


Speech pathologist consulted,


passed swallow eval, started on mechanical soft diet.


 


Full code status.








History


Interval history: 


Admitted with altered mental status








Hospitalist Physical





- Physical exam


Narrative exam: 








General appearance: Present: well-nourished





- EENT


Eyes: Present: PERRL


ENT: hearing intact, clear oral mucosa





- Neck


Neck: Present: supple, normal ROM





- Respiratory


Respiratory effort: normal


Respiratory: bilateral: CTA





- Cardiovascular


Heart Sounds: Present: S1 & S2.  Absent: rub, click





- Extremities


Extremities: pulses symmetrical, No edema


Peripheral Pulses: within normal limits





- Abdominal


General gastrointestinal: Present: soft, non-tender, non-distended, normal bowel

sounds


Female genitourinary: Present: normal





- Integumentary


Integumentary: Present: clear, warm, dry





- Musculoskeletal


Musculoskeletal: gait normal, strength equal bilaterally





- Psychiatric


Psychiatric: appropriate mood/affect, intact judgment & insight





- Neurologic


Neurologic: CNII-XII intact, Awake,alert, moves all extremities








- Constitutional


Vitals: 


                                        











Temp Pulse Resp BP Pulse Ox


 


 98.2 F   69   18   113/61   97 


 


 01/02/20 13:16  01/02/20 13:16  01/02/20 13:16  01/02/20 13:16  01/02/20 13:16











General appearance: Present: well-nourished





Results





- Labs


CBC & Chem 7: 


                                 01/02/20 04:53





                                 01/02/20 04:53


Labs: 


                             Laboratory Last Values











WBC  8.6 K/mm3 (4.5-11.0)   01/02/20  04:53    


 


RBC  4.28 M/mm3 (3.65-5.03)   01/02/20  04:53    


 


Hgb  11.1 gm/dl (10.1-14.3)   01/02/20  04:53    


 


Hct  35.1 % (30.3-42.9)   01/02/20  04:53    


 


MCV  82 fl (79-97)   01/02/20  04:53    


 


MCH  26 pg (28-32)  L  01/02/20  04:53    


 


MCHC  32 % (30-34)   01/02/20  04:53    


 


RDW  16.1 % (13.2-15.2)  H  01/02/20  04:53    


 


Plt Count  193 K/mm3 (140-440)   01/02/20  04:53    


 


Add Manual Diff  Complete   12/31/19  11:40    


 


Total Counted  100   12/31/19  11:40    


 


Seg Neuts % (Manual)  91.0 % (40.0-70.0)  H  12/31/19  11:40    


 


Band Neutrophils %  0 %  12/31/19  11:40    


 


Lymphocytes % (Manual)  7.0 % (13.4-35.0)  L  12/31/19  11:40    


 


Reactive Lymphs % (Man)  0 %  12/31/19  11:40    


 


Monocytes % (Manual)  1.0 % (0.0-7.3)   12/31/19  11:40    


 


Eosinophils % (Manual)  0 % (0.0-4.3)   12/31/19  11:40    


 


Basophils % (Manual)  1.0 % (0.0-1.8)   12/31/19  11:40    


 


Metamyelocytes %  0 %  12/31/19  11:40    


 


Myelocytes %  0 %  12/31/19  11:40    


 


Promyelocytes %  0 %  12/31/19  11:40    


 


Blast Cells %  0 %  12/31/19  11:40    


 


Nucleated RBC %  1.0 % (0.0-0.9)  H  12/31/19  11:40    


 


Seg Neutrophils # Man  7.0 K/mm3 (1.8-7.7)   12/31/19  11:40    


 


Band Neutrophils #  0.0 K/mm3  12/31/19  11:40    


 


Lymphocytes # (Manual)  0.5 K/mm3 (1.2-5.4)  L  12/31/19  11:40    


 


Abs React Lymphs (Man)  0.0 K/mm3  12/31/19  11:40    


 


Monocytes # (Manual)  0.1 K/mm3 (0.0-0.8)   12/31/19  11:40    


 


Eosinophils # (Manual)  0.0 K/mm3 (0.0-0.4)   12/31/19  11:40    


 


Basophils # (Manual)  0.1 K/mm3 (0.0-0.1)   12/31/19  11:40    


 


Metamyelocytes #  0.0 K/mm3  12/31/19  11:40    


 


Myelocytes #  0.0 K/mm3  12/31/19  11:40    


 


Promyelocytes #  0.0 K/mm3  12/31/19  11:40    


 


Blast Cells #  0.0 K/mm3  12/31/19  11:40    


 


WBC Morphology  Not Reportable   12/31/19  11:40    


 


Hypersegmented Neuts  Not Reportable   12/31/19  11:40    


 


Hyposegmented Neuts  Not Reportable   12/31/19  11:40    


 


Hypogranular Neuts  Not Reportable   12/31/19  11:40    


 


Smudge Cells  Not Reportable   12/31/19  11:40    


 


Toxic Granulation  Not Reportable   12/31/19  11:40    


 


Toxic Vacuolation  Not Reportable   12/31/19  11:40    


 


Dohle Bodies  Not Reportable   12/31/19  11:40    


 


Pelger-Huet Anomaly  Not Reportable   12/31/19  11:40    


 


Clara Rods  Not Reportable   12/31/19  11:40    


 


Platelet Estimate  Consistent w auto   12/31/19  11:40    


 


Clumped Platelets  Not Reportable   12/31/19  11:40    


 


Plt Clumps, EDTA  Not Reportable   12/31/19  11:40    


 


Large Platelets  Not Reportable   12/31/19  11:40    


 


Giant Platelets  Not Reportable   12/31/19  11:40    


 


Platelet Satelliting  Not Reportable   12/31/19  11:40    


 


Plt Morphology Comment  Not Reportable   12/31/19  11:40    


 


RBC Morphology  Not Reportable   12/31/19  11:40    


 


Dimorphic RBCs  Not Reportable   12/31/19  11:40    


 


Polychromasia  Not Reportable   12/31/19  11:40    


 


Hypochromasia  Few   12/31/19  11:40    


 


Poikilocytosis  Few   12/31/19  11:40    


 


Anisocytosis  Few   12/31/19  11:40    


 


Microcytosis  Not Reportable   12/31/19  11:40    


 


Macrocytosis  Not Reportable   12/31/19  11:40    


 


Spherocytes  Not Reportable   12/31/19  11:40    


 


Pappenheimer Bodies  Not Reportable   12/31/19  11:40    


 


Sickle Cells  Not Reportable   12/31/19  11:40    


 


Target Cells  Few   12/31/19  11:40    


 


Tear Drop Cells  Not Reportable   12/31/19  11:40    


 


Ovalocytes  Not Reportable   12/31/19  11:40    


 


Helmet Cells  Not Reportable   12/31/19  11:40    


 


Camacho-Truchas Bodies  Not Reportable   12/31/19  11:40    


 


Cabot Rings  Not Reportable   12/31/19  11:40    


 


Denis Cells  Not Reportable   12/31/19  11:40    


 


Bite Cells  Not Reportable   12/31/19  11:40    


 


Crenated Cell  Not Reportable   12/31/19  11:40    


 


Elliptocytes  Not Reportable   12/31/19  11:40    


 


Acanthocytes (Spur)  Not Reportable   12/31/19  11:40    


 


Rouleaux  Not Reportable   12/31/19  11:40    


 


Hemoglobin C Crystals  Not Reportable   12/31/19  11:40    


 


Schistocytes  Not Reportable   12/31/19  11:40    


 


Malaria parasites  Not Reportable   12/31/19  11:40    


 


Miah Bodies  Not Reportable   12/31/19  11:40    


 


Hem Pathologist Commnt  No   12/31/19  11:40    


 


Sodium  133 mmol/L (137-145)  L  01/02/20  04:53    


 


Potassium  4.4 mmol/L (3.6-5.0)   01/02/20  04:53    


 


Chloride  98.8 mmol/L ()   01/02/20  04:53    


 


Carbon Dioxide  20 mmol/L (22-30)  L  01/02/20  04:53    


 


Anion Gap  19 mmol/L  01/02/20  04:53    


 


BUN  16 mg/dL (7-17)   01/02/20  04:53    


 


Creatinine  3.7 mg/dL (0.7-1.2)  H  01/02/20  04:53    


 


Estimated GFR  14 ml/min  01/02/20  04:53    


 


BUN/Creatinine Ratio  4 %  01/02/20  04:53    


 


Glucose  164 mg/dL ()  H  01/02/20  04:53    


 


POC Glucose  227  ()  H  01/02/20  16:12    


 


Calcium  7.7 mg/dL (8.4-10.2)  L  01/02/20  04:53    


 


Phosphorus  4.70 mg/dL (2.5-4.5)  H  01/01/20  05:27    


 


Total Bilirubin  0.40 mg/dL (0.1-1.2)   12/31/19  11:40    


 


AST  28 units/L (5-40)   12/31/19  11:40    


 


ALT  11 units/L (7-56)   12/31/19  11:40    


 


Alkaline Phosphatase  87 units/L ()   12/31/19  11:40    


 


Troponin T  0.046 ng/mL (0.00-0.029)  H D 12/31/19  19:48    


 


Total Protein  6.1 g/dL (6.3-8.2)  L  12/31/19  11:40    


 


Albumin  3.2 g/dL (3.9-5)  L  12/31/19  11:40    


 


Albumin/Globulin Ratio  1.1 %  12/31/19  11:40    


 


Triglycerides  103 mg/dL (2-149)   12/31/19  11:40    


 


Cholesterol  264 mg/dL ()  H  12/31/19  11:40    


 


LDL Cholesterol Direct  182 mg/dL ()  H  12/31/19  11:40    


 


HDL Cholesterol  62 mg/dL (40-59)  H  12/31/19  11:40    


 


Cholesterol/HDL Ratio  4.25 %  12/31/19  11:40    


 


Lipase  14 units/L (13-60)   12/31/19  11:40    


 


TSH  3.220 mlU/mL (0.270-4.200)   12/31/19  19:48    


 


Free T4  1.06 ng/dL (0.76-1.46)   12/31/19  19:48    


 


Thyroxine (T4)  6.5 ug/dL (4.0-12.0)   12/31/19  19:48    


 


Urine Color  Yellow  (Yellow)   12/31/19  13:06    


 


Urine Turbidity  Clear  (Clear)   12/31/19  13:06    


 


Urine pH  9.0  (5.0-7.0)  H  12/31/19  13:06    


 


Ur Specific Gravity  1.011  (1.003-1.030)   12/31/19  13:06    


 


Urine Protein  >2000 mg dl mg/dL (Negative)   12/31/19  13:06    


 


Urine Glucose (UA)  50 mg/dL (Negative)   12/31/19  13:06    


 


Urine Ketones  Tr mg/dL (Negative)   12/31/19  13:06    


 


Urine Blood  Neg  (Negative)   12/31/19  13:06    


 


Urine Nitrite  Neg  (Negative)   12/31/19  13:06    


 


Urine Bilirubin  Neg  (Negative)   12/31/19  13:06    


 


Urine Urobilinogen  < 2.0 mg/dL (<2.0)   12/31/19  13:06    


 


Ur Leukocyte Esterase  Neg  (Negative)   12/31/19  13:06    


 


Urine WBC (Auto)  1.0 /HPF (0.0-6.0)   12/31/19  13:06    


 


Urine RBC (Auto)  1.0 /HPF (0.0-6.0)   12/31/19  13:06    


 


Hyaline Casts  1 /LPF  12/31/19  13:06    


 


Hepatitis A IgM Ab  Non-reactive  (NonReactive)   12/31/19  19:48    


 


Hep Bs Antigen  Non-reactive  (Negative)   12/31/19  19:48    


 


Hep B Core IgM Ab  Non-reactive  (NonReactive)   12/31/19  19:48    


 


Hepatitis C Antibody  Non-reactive  (NonReactive)   12/31/19  19:48    














Active Medications





- Current Medications


Current Medications: 














Generic Name Dose Route Start Last Admin





  Trade Name Freq  PRN Reason Stop Dose Admin


 


Acetaminophen  650 mg  12/31/19 18:55  01/02/20 04:35





  Tylenol  PO   650 mg





  Q4H PRN   Administration





  Pain, Mild (1-3)  


 


Albuterol  2.5 mg  12/31/19 19:07 





  Proventil  IH  





  Q4HRT PRN  





  Shortness Of Breath  


 


Aspirin  300 mg  01/01/20 10:00  01/02/20 11:40





  Aspirin  ND   300 mg





  QDAY LUCAS   Administration


 


Atorvastatin Calcium  80 mg  01/01/20 22:00  01/01/20 23:06





  Lipitor  PO   80 mg





  QHS LUCAS   Administration


 


Bisacodyl  10 mg  12/31/19 18:55 





  Dulcolax  ND  





  QDAY PRN  





  Constipation  


 


Dextrose  50 ml  12/31/19 19:04 





  D50w (25gm) Syringe  IV  





  Q30MIN PRN  





  Hypoglycemia  





  Protocol  


 


Heparin Sodium (Porcine)  5,000 unit  01/01/20 22:00  01/02/20 11:17





  Heparin  SUB-Q   5,000 unit





  Q12HR LUCAS   Administration


 


Dextrose/Sodium Chloride  1,000 mls @ 42 mls/hr  12/31/19 20:00  01/01/20 22:51





  D5/0.45ns  IV   42 mls/hr





  AS DIRECT LUCAS   Administration


 


Sodium Chloride  100 mls @ 999 mls/hr  12/31/19 19:58 





  Nacl 0.9%  IV  





  ARGELIA PRN  





  Hypotension  


 


Sodium Chloride  100 mls @ 999 mls/hr  01/02/20 15:16 





  Nacl 0.9%  IV  





  ARGELIA PRN  





  Hypotension  


 


Insulin Human Regular  0 units  01/02/20 16:30 





  Humulin R  SUB-Q  





  ACHS LUCAS  





  Protocol  


 


Levothyroxine Sodium  50 mcg  01/02/20 10:00  01/02/20 11:17





  Synthroid  PO   50 mcg





  QAM LUCAS   Administration


 


Losartan Potassium  50 mg  01/01/20 17:00  01/02/20 11:16





  Cozaar  PO   50 mg





  QDAY LUCAS   Administration


 


Magnesium Hydroxide  30 ml  12/31/19 18:55 





  Milk Of Magnesia  PO  





  Q4H PRN  





  Constipation  


 


Metoclopramide HCl  10 mg  12/31/19 18:55 





  Reglan  PO  





  Q6H PRN  





  Nausea And Vomiting  


 


Metoprolol Tartrate  25 mg  01/01/20 22:00  01/02/20 11:17





  Metoprolol  PO   25 mg





  BID LUCAS   Administration


 


Ondansetron HCl  4 mg  12/31/19 18:55 





  Zofran  IV  





  Q8H PRN  





  Nausea And Vomiting  


 


Pantoprazole Sodium  40 mg  01/01/20 10:00  01/02/20 11:16





  Protonix  IV   40 mg





  QDAY LUCAS   Administration


 


Promethazine HCl  25 mg  12/31/19 18:55 





  Phenergan  ND  





  Q6H PRN  





  Nausea And Vomiting  


 


Sodium Chloride  10 ml  12/31/19 18:55  01/02/20 11:17





  Sodium Chloride Flush Syringe 10 Ml  IV   10 ml





  PRN PRN   Administration





  LINE FLUSH  














Nutrition/Malnutrition Assess





- Dietary Evaluation


Nutrition/Malnutrition Findings: 


                                        





Nutrition Notes                                            Start:  01/01/20 

13:52


Freq:                                                      Status: Active       




Protocol:                                                                       




 Document     01/01/20 13:52  NHALL  (Rec: 01/01/20 13:57  GERBER  SRW-

[email or web address])


 Nutrition Notes


     Need for Assessment generated from:         MD Order,Education


     Initial or Follow up                        Assessment


     Current Diagnosis                           CKD (stage V CKD),Diabetes,


                                                 Hypertension,Hyperlipidemia


     Other Pertinent Diagnosis                   AMS, Dysphagia


     Current Diet                                NPO


     Labs/Tests                                  K 5.1


                                                 BUN 19


                                                 Cr 4.2


                                                 Phos 4.7


     Pertinent Medications                       D5 1/2NS at 42ml/hr


     Height                                      5 ft 3 in


     Weight                                      57.5 kg


     Ideal Body Weight (kg)                      52.27


     BMI                                         22.4


     Subjective/Other Information                RD consulted for NTR


                                                 recommendations and diet


                                                 education.  Pt also screened


                                                 for malnutrition risk (wt loss


                                                 , poor appetite).  SLP


                                                 consulted.


     Burn                                        Absent


     Trauma                                      Absent


     #1


      Nutrition Diagnosis                        Inadequate oral intake


      Etiology                                   AMS, Dysphagia


      As Evidenced by Signs and Symptoms         pt NPO


     Is patient on ventilator?                   No


     Is Patient Ambulatory and/or Out of Bed     No


     REE-(McDonald-St. Jeor-confined to bed)      1218.036


     Kcal/Kg value to use for calculation        30


     Approximate Energy Requirements Using       1725


      kcal/Kg                                    


     Calculation Used for Recommendations        Kcal/kg


     Additional Notes                            Pro needs >1.2g/kg: >69g/day


                                                 Fluid needs 1-1.5L/day


 Nutrition Intervention


     Change Diet Order:                          Advance diet to renal when


                                                 medically feasible


     Goal #1                                     Diet advancement to meet


                                                 nutrient needs


     Anticipated Discharge Needs:                None identified at this time


     Follow-Up By:                               01/03/20


     Additional Comments                         F/U: SLP adis, diet


                                                 advancement, diet education


                                                 needs

## 2020-01-03 VITALS — SYSTOLIC BLOOD PRESSURE: 115 MMHG | DIASTOLIC BLOOD PRESSURE: 60 MMHG

## 2020-01-03 LAB
BUN SERPL-MCNC: 19 MG/DL (ref 7–17)
BUN/CREAT SERPL: 5 %
CALCIUM SERPL-MCNC: 7.8 MG/DL (ref 8.4–10.2)
HCT VFR BLD CALC: 34.4 % (ref 30.3–42.9)
HEMOLYSIS INDEX: 55
HGB BLD-MCNC: 10.8 GM/DL (ref 10.1–14.3)
MCHC RBC AUTO-ENTMCNC: 32 % (ref 30–34)
MCV RBC AUTO: 82 FL (ref 79–97)
PLATELET # BLD: 141 K/MM3 (ref 140–440)
RBC # BLD AUTO: 4.2 M/MM3 (ref 3.65–5.03)

## 2020-01-03 PROCEDURE — 5A1D70Z PERFORMANCE OF URINARY FILTRATION, INTERMITTENT, LESS THAN 6 HOURS PER DAY: ICD-10-PCS | Performed by: INTERNAL MEDICINE

## 2020-01-03 RX ADMIN — ASPIRIN SCH: 300 SUPPOSITORY RECTAL at 10:55

## 2020-01-03 RX ADMIN — HEPARIN SODIUM SCH: 5000 INJECTION, SOLUTION INTRAVENOUS; SUBCUTANEOUS at 10:56

## 2020-01-03 RX ADMIN — INSULIN HUMAN SCH: 100 INJECTION, SOLUTION PARENTERAL at 07:27

## 2020-01-03 RX ADMIN — LEVOTHYROXINE SODIUM SCH: 0.05 TABLET ORAL at 10:56

## 2020-01-03 RX ADMIN — METOPROLOL TARTRATE SCH: 25 TABLET, FILM COATED ORAL at 10:55

## 2020-01-03 RX ADMIN — LOSARTAN POTASSIUM SCH: 50 TABLET, FILM COATED ORAL at 10:56

## 2020-01-03 RX ADMIN — INSULIN HUMAN SCH: 100 INJECTION, SOLUTION PARENTERAL at 16:30

## 2020-01-03 RX ADMIN — ACETAMINOPHEN PRN MG: 325 TABLET ORAL at 12:26

## 2020-01-03 RX ADMIN — PANTOPRAZOLE SODIUM SCH: 40 INJECTION, POWDER, FOR SOLUTION INTRAVENOUS at 10:55

## 2020-01-03 RX ADMIN — INSULIN HUMAN SCH: 100 INJECTION, SOLUTION PARENTERAL at 11:30

## 2020-01-03 NOTE — PROGRESS NOTE
Assessment and Plan


Patient is an 80 y/o woman w/ a h/o ESRD on HD, DM, HTN, HLD, hypothyroidism, 

who p/w altered mental status and dysarthria. According to the patient's 

clinical findings, it is possible that she has had a TIA. Alternatively, her 

symptoms may have been due to hypoglycemia as glucose was in the 60's when EMS 

arrived. 





Plan:


1. TIA vs. hypoglycemic episode:


- Symptoms improved today. 


- MRI brain: no acute abnormality


- MRA head/neck - stenosis noted in left M1 and b/l PCAs.


- Echo: EF 30-35%, LA normal size, bubble study negative. 


- Cont. ASA


- Cont. statin. . Goal statin <70. 


- Telemetry monitoring while in house


- PT/OT/ST


- DVT Ppx: recommend lovenox


- UA not indicative of UTI





2. Hypertension:


- Recommend target normotension. 





- Will sign off as I am not covering neurology service over the weekend. 

Recommend to consult neurologist covering service over the weekend for further 

neurologic monitoring and management. 





Thank you for allowing me to take part in the care of this patient.





Jules Mead MD


Neurology








Subjective


Date of service: 01/03/20


Principal diagnosis: TIA


Interval history: 


No acute events overnight.








Objective





- Exam


Narrative Exam: 


Patient is awake, alert, oriented 4, follows complex commands.  No dysarthria 

or aphasia noted.  PERRL, EOMI, VFF, no facial weakness noted, bilaterally 

intact light touch, tongue midline.  Noted to have 4/5 strength in RUE/RLE 

limited due to chronic pain, 5/5 in LUE/LLE.  Bilaterally intact to light touch 

in all extremities.  Bilaterally intact to finger to nose and heel to shin.  2+ 

reflexes throughout.








- Vital Sign


                               Vital Signs - 12hr











  01/03/20 01/03/20 01/03/20





  07:31 08:54 09:34


 


Temperature 98.2 F  


 


Pulse Rate 57 L  


 


Respiratory 20  





Rate   


 


Blood Pressure 170/89 162/79 112/53


 


O2 Sat by Pulse 99  





Oximetry   














  01/03/20 01/03/20 01/03/20





  09:35 09:36 09:40


 


Temperature   


 


Pulse Rate   


 


Respiratory   





Rate   


 


Blood Pressure 97/50 99/52 112/67


 


O2 Sat by Pulse   





Oximetry   














  01/03/20 01/03/20 01/03/20





  09:41 11:55 12:00


 


Temperature   


 


Pulse Rate  68 66


 


Respiratory   





Rate   


 


Blood Pressure 120/56 184/100 179/79


 


O2 Sat by Pulse   





Oximetry   














  01/03/20 01/03/20 01/03/20





  12:15 12:30 12:35


 


Temperature   


 


Pulse Rate 58 L 60 55 L


 


Respiratory   





Rate   


 


Blood Pressure 132/70 90/50 74/36


 


O2 Sat by Pulse   





Oximetry   














  01/03/20 01/03/20 01/03/20





  12:40 12:45 13:09


 


Temperature   


 


Pulse Rate 62 61 46 L


 


Respiratory   





Rate   


 


Blood Pressure 84/50 106/55 151/72


 


O2 Sat by Pulse   





Oximetry   














  01/03/20 01/03/20 01/03/20





  13:15 13:30 13:45


 


Temperature   


 


Pulse Rate 55 L 46 L 54 L


 


Respiratory   





Rate   


 


Blood Pressure 128/69 145/72 150/76


 


O2 Sat by Pulse   





Oximetry   














  01/03/20 01/03/20 01/03/20





  14:00 14:15 14:30


 


Temperature   


 


Pulse Rate 57 L 72 47 L


 


Respiratory   





Rate   


 


Blood Pressure 147/79 165/82 153/77


 


O2 Sat by Pulse   





Oximetry   














  01/03/20





  14:45


 


Temperature 


 


Pulse Rate 57 L


 


Respiratory 





Rate 


 


Blood Pressure 177/78


 


O2 Sat by Pulse 





Oximetry 














- General Apperance


Constitutional: comfortable





- EENT


EENT: ATNC, PERRL, mucous membranes moist, hearing intact, vision intact





- Respiratory


Respiratory: lungs clear, normal breath sounds





- Cardiovascular


Cardiovascular: regular rate, normal S1, normal S2


Extremities: no clubbing, cyanosis, no inflammation





- Gastrointestinal


Gastrointestinal: normoactive bowel sounds, soft, non-tender





- Integumentary


Integumentary: normal





- Musculoskeletal


Musculoskeletal: pain in joint, no fluid collection





- Psychiatric


Psychiatric: mood/affect appropriate





- Laboratory Findings


CBC and BMP: 


                                 01/03/20 05:04





                                 01/03/20 05:04


Abnormal Lab Findings: 


                                  Abnormal Labs











  12/31/19 12/31/19 12/31/19





  11:30 11:40 11:40


 


RBC   5.57 H 


 


Hct   46.4 H 


 


MCH   26 L 


 


RDW   16.6 H 


 


Seg Neuts % (Manual)   91.0 H 


 


Lymphocytes % (Manual)   7.0 L 


 


Nucleated RBC %   1.0 H 


 


Lymphocytes # (Manual)   0.5 L 


 


Sodium   


 


Potassium    5.1 H


 


Carbon Dioxide   


 


BUN   


 


Creatinine    3.9 H


 


Glucose   


 


POC Glucose  60 L  


 


Calcium   


 


Phosphorus   


 


Troponin T   


 


Total Protein    6.1 L


 


Albumin    3.2 L


 


Cholesterol   


 


LDL Cholesterol Direct   


 


HDL Cholesterol   


 


Urine pH   














  12/31/19 12/31/19 12/31/19





  11:40 12:45 13:06


 


RBC   


 


Hct   


 


MCH   


 


RDW   


 


Seg Neuts % (Manual)   


 


Lymphocytes % (Manual)   


 


Nucleated RBC %   


 


Lymphocytes # (Manual)   


 


Sodium   


 


Potassium   


 


Carbon Dioxide   


 


BUN   


 


Creatinine   


 


Glucose   


 


POC Glucose   169 H 


 


Calcium   


 


Phosphorus   


 


Troponin T  0.042 H  


 


Total Protein   


 


Albumin   


 


Cholesterol  264 H  


 


LDL Cholesterol Direct  182 H  


 


HDL Cholesterol  62 H  


 


Urine pH    9.0 H














  12/31/19 01/01/20 01/01/20





  19:48 05:27 05:27


 


RBC   


 


Hct   


 


MCH   26 L 


 


RDW   16.5 H 


 


Seg Neuts % (Manual)   


 


Lymphocytes % (Manual)   


 


Nucleated RBC %   


 


Lymphocytes # (Manual)   


 


Sodium   


 


Potassium    5.1 H


 


Carbon Dioxide   


 


BUN    19 H


 


Creatinine    4.2 H


 


Glucose   


 


POC Glucose   


 


Calcium    8.0 L


 


Phosphorus    4.70 H


 


Troponin T  0.046 H D  


 


Total Protein   


 


Albumin   


 


Cholesterol   


 


LDL Cholesterol Direct   


 


HDL Cholesterol   


 


Urine pH   














  01/01/20 01/02/20 01/02/20





  19:31 01:01 04:53


 


RBC   


 


Hct   


 


MCH    26 L


 


RDW    16.1 H


 


Seg Neuts % (Manual)   


 


Lymphocytes % (Manual)   


 


Nucleated RBC %   


 


Lymphocytes # (Manual)   


 


Sodium   


 


Potassium   


 


Carbon Dioxide   


 


BUN   


 


Creatinine   


 


Glucose   


 


POC Glucose  135 H  181 H 


 


Calcium   


 


Phosphorus   


 


Troponin T   


 


Total Protein   


 


Albumin   


 


Cholesterol   


 


LDL Cholesterol Direct   


 


HDL Cholesterol   


 


Urine pH   














  01/02/20 01/02/20 01/02/20





  04:53 06:05 11:47


 


RBC   


 


Hct   


 


MCH   


 


RDW   


 


Seg Neuts % (Manual)   


 


Lymphocytes % (Manual)   


 


Nucleated RBC %   


 


Lymphocytes # (Manual)   


 


Sodium  133 L  


 


Potassium   


 


Carbon Dioxide  20 L  


 


BUN   


 


Creatinine  3.7 H  


 


Glucose  164 H  


 


POC Glucose   170 H  182 H


 


Calcium  7.7 L  


 


Phosphorus   


 


Troponin T   


 


Total Protein   


 


Albumin   


 


Cholesterol   


 


LDL Cholesterol Direct   


 


HDL Cholesterol   


 


Urine pH   














  01/02/20 01/02/20 01/03/20





  16:12 22:40 05:04


 


RBC   


 


Hct   


 


MCH    26 L


 


RDW    15.9 H


 


Seg Neuts % (Manual)   


 


Lymphocytes % (Manual)   


 


Nucleated RBC %   


 


Lymphocytes # (Manual)   


 


Sodium   


 


Potassium   


 


Carbon Dioxide   


 


BUN   


 


Creatinine   


 


Glucose   


 


POC Glucose  227 H  126 H 


 


Calcium   


 


Phosphorus   


 


Troponin T   


 


Total Protein   


 


Albumin   


 


Cholesterol   


 


LDL Cholesterol Direct   


 


HDL Cholesterol   


 


Urine pH   














  01/03/20





  05:04


 


RBC 


 


Hct 


 


MCH 


 


RDW 


 


Seg Neuts % (Manual) 


 


Lymphocytes % (Manual) 


 


Nucleated RBC % 


 


Lymphocytes # (Manual) 


 


Sodium  135 L


 


Potassium 


 


Carbon Dioxide  20 L


 


BUN  19 H


 


Creatinine  4.2 H


 


Glucose 


 


POC Glucose 


 


Calcium  7.8 L


 


Phosphorus 


 


Troponin T 


 


Total Protein 


 


Albumin 


 


Cholesterol 


 


LDL Cholesterol Direct 


 


HDL Cholesterol 


 


Urine pH

## 2020-01-03 NOTE — DISCHARGE SUMMARY
Providers





- Providers


Date of Admission: 


12/31/19 19:14





Date of discharge: 01/03/20


Attending physician: 


THA LINK





                                        





12/31/19


Consult to Physician [CONS] Routine 


   Comment: 


   Consulting Provider: GABE FORDE


   Physician Instructions: 


   Reason For Exam: cva





12/31/19 11:50


Speech Therapy Evaluation and Treat [CONS] Routine 


   Reason For Exam: failed bedside swallow





12/31/19 15:32


Consult to Physician [CONS] Urgent 


   Comment: 


   Consulting Provider: ESTEFANIA CHAUHAN


   Physician Instructions: 


   Reason For Exam: esrd on dialysis delisa hoff,sat





12/31/19 18:55


Consult to Case Management [CONS] Routine 


   Services Needed at Discharge: Other


   Notified:: yes


   Was contact made?: Yes


   If yes, spoke with:: elías


   Time called:: 10:37


   Comment:: working on it per elías/jarred


Consult to Dietitian/Nutrition [CONS] Routine 


   Physician Instructions: 


   Reason For Exam: 


   Reason for Consult: Nutrition Recommendations


   Reason for Consult: Diet education


Occupational Therapy Evaluate and Treat [CONS] Routine 


   Comment: 


   Reason For Exam: Neuro deficits


Physical Therapy Evaluation and Treat [CONS] Routine 


   Comment: 


   Reason For Exam: Neuro deficits





12/31/19 19:09


Consult to Physician [CONS] Routine 


   Comment: 


   Consulting Provider: BRYAN BYRD


   Physician Instructions: 


   Reason For Exam: abnormal EKG











Primary care physician: 


PRIMARY CARE MD








Hospitalization


Condition: Fair


Disposition: DC-01 TO HOME OR SELFCARE





- Discharge Diagnoses


(1) TIA (transient ischemic attack)


Status: Acute   





(2) Acute metabolic encephalopathy


Status: Acute   





Core Measure Documentation





- Palliative Care


Palliative Care/ Comfort Measures: Not Applicable





Exam





- Constitutional


Vitals: 


                                        











Temp Pulse Resp BP Pulse Ox


 


 98.2 F   46 L  20   151/72   99 


 


 01/03/20 07:31  01/03/20 13:09  01/03/20 07:31  01/03/20 13:09  01/03/20 07:31














Plan


Activity: advance as tolerated


Diet: low fat, low cholesterol, low salt, diabetic, renal


Plan of Treatment: 


1.Follow up with Physician at [facility]


2.Hemodalysis as scheduled


Follow up with: 


PRIMARY CARE,MD [Primary Care Provider] - 7 Days


Prescriptions: 


Aspirin EC [Halfprin EC] 81 mg PO QDAY #30 tablet.


AtorvaSTATin [Lipitor] 80 mg PO QHS #60 tablet


Metoprolol [Lopressor TAB] 12.5 mg PO BID #60 tablet

## 2020-01-03 NOTE — PROGRESS NOTE
Assessment and Plan


Acute Metabolic Encephalopathy, ? CVA


ESRD on HD


HTN


DM Type 2 on insulin


Hypothyroidism


Hyperkalemia


Osteoarthritis of right hip and right knee


Elevated Troponin








Plan:





- HD today for gentle UF and clearance, goal UF 1 liter, will try to avoid 

hypotension during HD


- Assess need for HD on daily basis


- CT Head showed no acute finding, MRI Brain, MRA Head, carotid dopplers pending


- Permissive HTN, s/p ASA, neurology consulted


- Holding BP medications for now


- Cardiology consulted for possible lateral ischemia


- Renally dose meds


- This pt undergoes outpatient HD at [facility]





Adelso Cavazos MD


[phone]








Subjective


Date of service: 01/03/20


Principal diagnosis: abn ekg


Interval history: 


patient was in HD this AM








Objective





- Vital Signs


Vital signs: 


                               Vital Signs - 12hr











  01/03/20 01/03/20 01/03/20





  00:34 01:28 07:31


 


Temperature  97.6 F 98.2 F


 


Pulse Rate 58 L 61 57 L


 


Respiratory  20 20





Rate   


 


Blood Pressure  158/77 170/89


 


O2 Sat by Pulse  98 99





Oximetry   














  01/03/20 01/03/20 01/03/20





  08:54 09:34 09:35


 


Temperature   


 


Pulse Rate   


 


Respiratory   





Rate   


 


Blood Pressure 162/79 112/53 97/50


 


O2 Sat by Pulse   





Oximetry   














  01/03/20 01/03/20 01/03/20





  09:36 09:40 09:41


 


Temperature   


 


Pulse Rate   


 


Respiratory   





Rate   


 


Blood Pressure 99/52 112/67 120/56


 


O2 Sat by Pulse   





Oximetry   














- Lab





                                 01/03/20 05:04





                                 01/03/20 05:04


                             Most recent lab results











Calcium  7.8 mg/dL (8.4-10.2)  L  01/03/20  05:04    


 


Phosphorus  4.70 mg/dL (2.5-4.5)  H  01/01/20  05:27    














Medications & Allergies





- Medications


Allergies/Adverse Reactions: 


                                    Allergies





No Known Allergies Allergy (Unverified 12/31/19 11:23)


   








Home Medications: 


                                Home Medications











 Medication  Instructions  Recorded  Confirmed  Last Taken  Type


 


ALBUTEROL Inhaler (OR & NICU) 2 puff IH QID PRN 12/31/19 12/31/19 Unknown 

History





[ProAir HFA Inhaler]     


 


AtorvaSTATin [Lipitor] 40 mg PO QHS 12/31/19 12/31/19 Unknown History


 


Hydralazine HCl 50 mg PO TID 12/31/19 12/31/19 Unknown History


 


Insulin Glargine [Lantus VIAL] 20 unit SUB-Q QHS 12/31/19 12/31/19 Unknown 

History


 


Levothyroxine [Synthroid] 50 mcg PO QAM 12/31/19 12/31/19 Unknown History


 


Losartan [Cozaar] 50 mg PO QDAY 12/31/19 12/31/19 Unknown History


 


Metoprolol [Lopressor] 25 mg PO BID 12/31/19 12/31/19 Unknown History


 


NIFEdipine [Nifedipine ER] 90 mg PO DAILY 12/31/19 12/31/19 Unknown History


 


Pantoprazole [Protonix] 40 mg PO QAM 12/31/19 12/31/19 Unknown History


 


Potassium Chloride [K-Dur] 20 meq PO BID 12/31/19 12/31/19 Unknown History


 


amLODIPine [Norvasc] 10 mg PO DAILY 12/31/19 12/31/19 Unknown History


 


carvediloL [Coreg] 6.25 mg PO BID 12/31/19 12/31/19 Unknown History


 


lisinopriL [Zestril] 20 mg PO QDAY 12/31/19 12/31/19 Unknown History











Active Medications: 














Generic Name Dose Route Start Last Admin





  Trade Name Freq  PRN Reason Stop Dose Admin


 


Acetaminophen  650 mg  12/31/19 18:55  01/02/20 04:35





  Tylenol  PO   650 mg





  Q4H PRN   Administration





  Pain, Mild (1-3)  


 


Albuterol  2.5 mg  12/31/19 19:07 





  Proventil  IH  





  Q4HRT PRN  





  Shortness Of Breath  


 


Aspirin  300 mg  01/01/20 10:00  01/03/20 10:55





  Aspirin  OK   Not Given





  QDAY LUCAS  


 


Atorvastatin Calcium  80 mg  01/01/20 22:00  01/02/20 21:34





  Lipitor  PO   80 mg





  QHS LUCAS   Administration


 


Bisacodyl  10 mg  12/31/19 18:55 





  Dulcolax  OK  





  QDAY PRN  





  Constipation  


 


Dextrose  50 ml  12/31/19 19:04 





  D50w (25gm) Syringe  IV  





  Q30MIN PRN  





  Hypoglycemia  





  Protocol  


 


Heparin Sodium (Porcine)  5,000 unit  01/01/20 22:00  01/03/20 10:56





  Heparin  SUB-Q   Not Given





  Q12HR LUCAS  


 


Dextrose/Sodium Chloride  1,000 mls @ 42 mls/hr  12/31/19 20:00  01/01/20 22:51





  D5/0.45ns  IV   42 mls/hr





  AS DIRECT LUCAS   Administration


 


Sodium Chloride  100 mls @ 999 mls/hr  12/31/19 19:58 





  Nacl 0.9%  IV  





  ARGELIA PRN  





  Hypotension  


 


Sodium Chloride  100 mls @ 999 mls/hr  01/02/20 15:16 





  Nacl 0.9%  IV  





  ARGELIA PRN  





  Hypotension  


 


Insulin Human Regular  0 units  01/02/20 16:30  01/03/20 07:27





  Humulin R  SUB-Q   Not Given





  [facility]  





  Protocol  


 


Levothyroxine Sodium  50 mcg  01/02/20 10:00  01/03/20 10:56





  Synthroid  PO   Not Given





  QAM [facility]  


 


Losartan Potassium  50 mg  01/01/20 17:00  01/03/20 10:56





  Cozaar  PO   Not Given





  QDAY [facility]  


 


Magnesium Hydroxide  30 ml  12/31/19 18:55 





  Milk Of Magnesia  PO  





  Q4H PRN  





  Constipation  


 


Metoclopramide HCl  10 mg  12/31/19 18:55 





  Reglan  PO  





  Q6H PRN  





  Nausea And Vomiting  


 


Metoprolol Tartrate  25 mg  01/01/20 22:00  01/03/20 10:55





  Metoprolol  PO   Not Given





  BID [facility]  


 


Ondansetron HCl  4 mg  12/31/19 18:55 





  Zofran  IV  





  Q8H PRN  





  Nausea And Vomiting  


 


Pantoprazole Sodium  40 mg  01/01/20 10:00  01/03/20 10:55





  Protonix  IV   Not Given





  QDAY [facility]  


 


Promethazine HCl  25 mg  12/31/19 18:55 





  Phenergan  OK  





  Q6H PRN  





  Nausea And Vomiting  


 


Sodium Chloride  10 ml  12/31/19 18:55  01/02/20 11:17





  Sodium Chloride Flush Syringe 10 Ml  IV   10 ml





  PRN PRN   Administration





  LINE FLUSH

## 2020-01-03 NOTE — PROGRESS NOTE
Assessment and Plan


s/p lexiscan MPI stress test this AM which showed fixed defects, no significant 

ischemia, EF 47%. 


Currently stable cardiac status. Cont present cardiac management. Nothing 

further to add from cardiac perspective. Will sign off.  


Recommend follow up in our office with Dr. Pham within 1-2 weeks of discharge

([phone]). 





The patient has been seen in conjunction with Dr. Siegel who agrees with the 

assessment and plan of care.





- Patient Problems


(1) Abnormal EKG


Current Visit: Yes   Status: Acute   





(2) Cardiomyopathy


Current Visit: Yes   Status: Acute   


Qualifiers: 


   Cardiomyopathy type: unspecified   Qualified Code(s): I42.9 - Cardiomyopathy,

unspecified   





(3) Elevated troponin


Current Visit: Yes   Status: Acute   





(4) CVA (cerebral vascular accident)


Current Visit: Yes   Status: Suspected   





(5) Altered mental state


Current Visit: Yes   Status: Acute   





(6) Diabetes


Current Visit: Yes   Status: Acute   





(7) ESRD on dialysis


Current Visit: Yes   Status: Acute   





(8) Hypothyroidism


Current Visit: Yes   Status: Chronic   








Subjective


Date of service: 01/03/20


Principal diagnosis: abn ekg


Interval history: 


for stress test today, no current complaints. in SR HR 70s.








Objective





                                Last Vital Signs











Temp  98.2 F   01/03/20 07:31


 


Pulse  57 L  01/03/20 07:31


 


Resp  20   01/03/20 07:31


 


BP  120/56   01/03/20 09:41


 


Pulse Ox  99   01/03/20 07:31

















- Physical Examination


General: Other (confused)


HEENT: Positive: PERRL


Neck: Positive: neck supple


Cardiac: Positive: Reg Rate and Rhythm, S1/S2


Lungs: Positive: Decreased Breath Sounds


Neuro: Positive: Other (confused)


Abdomen: Positive: Unremarkable


Skin: Positive: Clear


Musculoskeletal: other (ARIEL)


Extremities: Present: normal





- Labs and Meds


                                       CBC











  01/03/20 Range/Units





  05:04 


 


WBC  8.1  (4.5-11.0)  K/mm3


 


RBC  4.20  (3.65-5.03)  M/mm3


 


Hgb  10.8  (10.1-14.3)  gm/dl


 


Hct  34.4  (30.3-42.9)  %


 


Plt Count  141  (140-440)  K/mm3








                          Comprehensive Metabolic Panel











  01/03/20 Range/Units





  05:04 


 


Sodium  135 L  (137-145)  mmol/L


 


Potassium  4.4  (3.6-5.0)  mmol/L


 


Chloride  102.4  ()  mmol/L


 


Carbon Dioxide  20 L  (22-30)  mmol/L


 


BUN  19 H  (7-17)  mg/dL


 


Creatinine  4.2 H  (0.7-1.2)  mg/dL


 


Glucose  98  ()  mg/dL


 


Calcium  7.8 L  (8.4-10.2)  mg/dL














- Imaging and Cardiology


EKG: report reviewed (SR with ST-T changes suggestive of ischemia)


Echo: report reviewed (12/31/19: EF of 30-35%, moderate LVH, mild MR, mild AR.)





- EKG


Sinus rhythms and dysrhythmias: sinus rhythm


Repolarization changes or abnormalities: ST or T wave suggestive of ischemia

## 2020-01-03 NOTE — TREADMILL REPORT
THALLIUM REPORT



REASON FOR STUDY:  Chest pain.



IMAGING PROTOCOL:  The patient received 10 mCi of Tc-99m Tetrofosmin for rest

imaging, and 28 mCi of Tc-99m Tetrofosmin for stress imaging.  Imaging for all

procedures was completed 30-90 minutes following the initial injection of

Technetium 99m Tetrofosmin.  SPECT imaging in the 180 degree arc was performed

in the right anterior oblique projection.  Computerized reconstruction of the

images was performed for analysis.



NUCLEAR IMAGING RESULTS:  Technically suboptimal study due to significant soft

tissue attenuation artifact.  Normal left ventricular cavity size with no change

from stress to rest.  Distribution of radionuclide within the left ventricle

revealed a medium size area of photo-induction involving the inferior wall. 

The degree of photon reduction is moderate.  Rest imaging does not show any

significant improvement in this defect.  Gated SPECT imaging revealed mild

global left ventricular systolic dysfunction with severe septal hypokinesis. 

The calculated left ventricular ejection fraction is 47%.



IMPRESSION:

Technically suboptimal study.  

Medium size fixed inferior defect.  

Mild global left ventricular systolic dysfunction with severe septal 
hypokinesis.  EF:47%.  

These findings suggest prior infarction in the right coronary artery territory. 
No evidence of significant stress-induced ischemia.





DD: 01/03/2020 12:18

DT: 01/03/2020 13:29

[facility]# [id number]  [id number]

YOGESH/ART BARBOSA

## 2020-01-03 NOTE — TREADMILL REPORT
LEXISCAN STRESS TEST REPORT



REASON FOR STUDY:  Elevated troponin.



STRESS TEST PROTOCOL:  The patient received 0.4 mg of Lexiscan intravenously

over 10 seconds.  Tc-99m Tetrofosmin was subsequently injected.  

Baseline ECG: Normal sinus rhythm.  T-wave abnormality.  Consider inferior and 
anterolateral

ischemia.  

Lexiscan ECG: No significant change from baseline.  

No chest pain. 

No arrhythmias.



IMPRESSION:  Nondiagnostic due to baseline ECG abnormalities.  Nuclear imaging

report to follow.





DD: 01/03/2020 09:35

DT: 01/03/2020 09:40

JOB# [id number]  [id number]

YOGESH/ART BARBOSA

## 2020-10-26 ENCOUNTER — HOSPITAL ENCOUNTER (OUTPATIENT)
Dept: HOSPITAL 5 - OR | Age: 80
Discharge: HOME | End: 2020-10-26
Attending: SURGERY
Payer: MEDICARE

## 2020-10-26 VITALS — DIASTOLIC BLOOD PRESSURE: 73 MMHG | SYSTOLIC BLOOD PRESSURE: 149 MMHG

## 2020-10-26 DIAGNOSIS — E03.9: ICD-10-CM

## 2020-10-26 DIAGNOSIS — K21.9: ICD-10-CM

## 2020-10-26 DIAGNOSIS — Z86.73: ICD-10-CM

## 2020-10-26 DIAGNOSIS — I12.0: Primary | ICD-10-CM

## 2020-10-26 DIAGNOSIS — E78.00: ICD-10-CM

## 2020-10-26 DIAGNOSIS — Z98.42: ICD-10-CM

## 2020-10-26 DIAGNOSIS — E11.22: ICD-10-CM

## 2020-10-26 DIAGNOSIS — Z91.81: ICD-10-CM

## 2020-10-26 DIAGNOSIS — Z98.890: ICD-10-CM

## 2020-10-26 DIAGNOSIS — N18.6: ICD-10-CM

## 2020-10-26 DIAGNOSIS — M19.90: ICD-10-CM

## 2020-10-26 LAB
BUN SERPL-MCNC: 32 MG/DL (ref 7–17)
BUN/CREAT SERPL: 8 %
CALCIUM SERPL-MCNC: 9.8 MG/DL (ref 8.4–10.2)
HCT VFR BLD CALC: 29 % (ref 30.3–42.9)
HEMOLYSIS INDEX: 5
HGB BLD-MCNC: 9.8 GM/DL (ref 10.1–14.3)
MCHC RBC AUTO-ENTMCNC: 34 % (ref 30–34)
MCV RBC AUTO: 85 FL (ref 79–97)
PLATELET # BLD: 332 K/MM3 (ref 140–440)
RBC # BLD AUTO: 3.41 M/MM3 (ref 3.65–5.03)

## 2020-10-26 PROCEDURE — 85027 COMPLETE CBC AUTOMATED: CPT

## 2020-10-26 PROCEDURE — 80048 BASIC METABOLIC PNL TOTAL CA: CPT

## 2020-10-26 PROCEDURE — 82962 GLUCOSE BLOOD TEST: CPT

## 2020-10-26 PROCEDURE — A4649 SURGICAL SUPPLIES: HCPCS

## 2020-10-26 PROCEDURE — 36415 COLL VENOUS BLD VENIPUNCTURE: CPT

## 2020-10-26 PROCEDURE — 37607 LIG/BANDING ANGIOACS AV FSTL: CPT

## 2020-10-26 PROCEDURE — 37799 UNLISTED PX VASCULAR SURGERY: CPT

## 2020-10-26 NOTE — OPERATIVE REPORT
STAFF SURGEON:  Dr. Derrick Martini.



PREOPERATIVE DIAGNOSES:

1.  End-stage renal disease with malfunctioning.

2.  Left arm arteriovenous access.



POSTOPERATIVE DIAGNOSIS:  End-stage renal disease with the left upper extremity

pseudoaneurysm.



COMPLICATIONS:  None.



ESTIMATED BLOOD LOSS:  50 mL.



ANESTHESIA:  General.



PROCEDURES PERFORMED:

1.  Left upper extremity pseudoaneurysm excision and repair.

2.  Left upper extremity arteriovenous fistula branch ligation x 2.



SURGICAL FINDINGS:  The patient was found to have a large pseudoaneurysm arising

from the body of the AV fistula as well as 2 separate large branches emerging

from the cephalic vein.



INDICATIONS FOR PROCEDURE:  This is an 80-year-old female who initially

presented to our clinic with episode of access infiltrated on attempted

cannulation.  The patient had a diagnostic fistulogram, which demonstrated a

widely patent fistula, but with a large branch emerged from the cephalic vein. 

With these findings, it was felt that the patient would benefit from an

exploration and ligation of her fistula.  The patient and the patient's family

were explained the risks, benefits and alternatives of procedure, expressed

understanding and wished to proceed.



DESCRIPTION OF PROCEDURE:  After appropriate consent was obtained, the patient

was brought back to the operating room and placed on the operating table in

supine position with left arm extended.  The patient was given appropriate

medication for general anesthesia, had LMA placed without difficulty.  Left arm

was prepped and draped in the usual sterile fashion with ChloraPrep. 

Appropriate preoperative antibiotics were administered.  Appropriate timeout was

performed indicating correct patient, procedure and site of procedure.  We then

began the operation by making a longitudinal incision overlying the AV fistula

near the neck of the humerus.  This was carried through the subcutaneous tissue

with a combination of blunt dissection and electrocautery.  Dissection was

continued directly over the cephalic vein in which dissection proximal and

distal to the enlarged area was performed.  Upon further dissection, it was

clear that the patient had a pseudoaneurysm with a false lumen.  This was

carefully dissected away until the colon could be identified.  Once that was

identified, a digital compression was held distally towards the arterial inflow

for control and the hole, which was approximately 3 mm was closed with a running

5-0 Prolene suture in 2 layers.  Once complete flow was reestablished, in which

the fistula still had a nice palpable thrill.  The rest of the aneurysmal sac

was excised and discarded.  Further dissection of the fistula noted the 2 large

branches towards the distal end of our incision.  These were ligated with 2-0

silk sutures x 2.  Once complete, the fistula was mobilized in the entirety of

the incision and circumferentially to look for any other injuries that had

occurred to the fistula, there were none.  We then looked to obtain hemostasis

in a wound bed and the suture line, which was obtained with hemostatic agents. 

Once we were satisfied with hemostasis, we then proceeded to close the wound

with a deep subcutaneous layer with interrupted 3-0 PDS and then the skin was

approximated with staples.  Appropriate dressing was placed.  The patient

tolerated the procedure well, emerged from the general anesthesia, had LMA

removed, and was sent to recovery in stable condition.  All the sponges,

instrument and needle counts were correct at completion of the operation.





DD: 10/26/2020 12:33

DT: 10/26/2020 13:09

JOB# [id number]  [id number]

JACQUELYN/ART

## 2020-10-26 NOTE — POST OPERATIVE NOTE
Date of procedure: 10/26/20


Pre-op diagnosis: ESRD, malfunctioning left arm av access


Post-op diagnosis: other (ESRD, left upper extremity pseudoaneurysm)


Procedure: 





1. Left upper extremity pseudoaneurysm excision and repair


2. Left upper extremity AV Fistula Branch Ligation x 2


Anesthesia: GETA


Surgeon: MP WASHINGTON


Estimated blood loss: 50-100ml


Pathology: none


Condition: stable


Disposition: PACU

## 2020-10-26 NOTE — SHORT STAY SUMMARY
Short Stay Documentation


Date of service: 10/26/20





- History


H&P: obtained from office


Past Medical History: ESRD, hypertension





- Allergies and Medications


Current Medications: 


                                    Allergies





No Known Allergies Allergy (Unverified 10/21/20 15:47)


   





                                Home Medications











 Medication  Instructions  Recorded  Confirmed  Last Taken  Type


 


Albuterol Mdi (or & Nicu Only) 2 puff IH QID PRN 12/31/19 10/21/20 Unknown 

History





[ProAir HFA Inhaler]     


 


AtorvaSTATin [Lipitor] 40 mg PO QHS 12/31/19 10/21/20 Unknown History


 


Hydralazine HCl 50 mg PO TID 12/31/19 10/21/20 Unknown History


 


Insulin Glargine [Lantus VIAL] 20 unit SUB-Q QHS 12/31/19 10/21/20 Unknown 

History


 


Levothyroxine [Synthroid] 50 mcg PO QAM 12/31/19 10/21/20 Unknown History


 


Losartan [Cozaar] 50 mg PO QDAY 12/31/19 10/21/20 Unknown History


 


NIFEdipine [Nifedipine ER] 90 mg PO DAILY 12/31/19 10/21/20 Unknown History


 


Pantoprazole [Protonix TAB] 40 mg PO QAM 12/31/19 10/21/20 Unknown History


 


amLODIPine 10 mg PO DAILY 12/31/19 10/21/20 Unknown History


 


lisinopriL [Zestril TAB] 20 mg PO QDAY 12/31/19 10/21/20 Unknown History


 


Aspirin EC [Halfprin EC] 81 mg PO QDAY #30 tablet. 01/03/20 10/21/20 Unknown 

Rx


 


AtorvaSTATin [Lipitor] 80 mg PO QHS #60 tablet 01/03/20 10/21/20 Unknown Rx


 


Metoprolol [Lopressor TAB] 12.5 mg PO BID #60 tablet 01/03/20 10/21/20 Unknown 

Rx








Active Medications





Cefazolin Sodium (Ancef/Sterile Water 2 Gm/20 Ml)  2 gm IV PREOP NR


   Stop: 10/26/20 23:59


Fentanyl (Sublimaze)  50 mcg IV Q5MIN PRN


   PRN Reason: Pain , Severe (7-10)


   Stop: 10/26/20 23:00


Sodium Chloride (Nacl 0.9% 1000 Ml)  1,000 mls @ 42 mls/hr IV AS DIRECT LUCAS


   Stop: 10/26/20 23:59


   Last Admin: 10/26/20 09:00 Dose:  42 mls/hr


   Documented by: 











- Physical exam


General appearance: no acute distress


Lungs: Normal air movement


Extremities: no ischemia





- Hospital course


Hospital course: 





the patient was taken to the operating room and had a left arm pseudoaneurysm 

repair and branch ligation.  please refer to the operative note concerning 

details of the procedure.  the patient tolerated the procedure well and was  

discharged home in stable condition.  





- Disposition


Condition at discharge: Stable


Disposition: DC-01 TO HOME OR SELFCARE





Short Stay Discharge Plan


Follow up with: 


PRIMARY CARE,MD [Primary Care Provider] - 7 Days

## 2020-10-26 NOTE — ANESTHESIA CONSULTATION
Anesthesia Consult and Med Hx


Date of service: 10/26/20





- Airway


Anesthetic Teeth Evaluation: Good


ROM Head & Neck: Adequate (mild restricted extension)


Mental/Hyoid Distance: Adequate


Mallampati Class: Class III


Intubation Access Assessment: Possibly Difficult





- Pulmonary Exam


CTA: Yes





- Cardiac Exam


Cardiac Exam: RRR





- Pre-Operative Health Status


ASA Pre-Surgery Classification: ASA3


Proposed Anesthetic Plan: General





- Pre-Anesthesia Comment


Pre-Anesthesia Comments: Patient uses walker at home and needs use of both arms 

to assist with ambulation and transfers.





- Pulmonary


Hx Smoking: No


Hx Respiratory Symptoms: No





- Cardiovascular System


Hx Hypertension: Yes (took lisinopril this morning)


Hx Heart Attack/AMI: No (EF 35%, neg stress test 1/2020)


Hx Percutaneous Transluminal Coronary Angioplasty (PTCA): No


Hx Cardia Arrhythmia: No





- Central Nervous System


CVA: Yes (TIA)





- Gastrointestinal


Hx Gastroesophageal Reflux Disease: Yes (well controlled)





- Endocrine


Hx End Stage Renal Disease: Yes (last HD 10/24/20)


Hx Liver Disease: No


Hx Insulin Dependent Diabetes: Yes


Hx Hypothyroidism: Yes





- Hematic


Hx Anemia: Yes





- Other Systems


Hx Obesity: No





- Additional Comments


Anesthesia Medical History Comments: No hx anesthetic complications.